# Patient Record
Sex: FEMALE | URBAN - METROPOLITAN AREA
[De-identification: names, ages, dates, MRNs, and addresses within clinical notes are randomized per-mention and may not be internally consistent; named-entity substitution may affect disease eponyms.]

---

## 2020-10-05 ENCOUNTER — APPOINTMENT (OUTPATIENT)
Dept: URBAN - METROPOLITAN AREA CLINIC 252 | Age: 35
Setting detail: DERMATOLOGY
End: 2020-10-06

## 2020-10-05 VITALS — HEIGHT: 72 IN | WEIGHT: 175 LBS | RESPIRATION RATE: 18 BRPM

## 2020-10-05 DIAGNOSIS — D22 MELANOCYTIC NEVI: ICD-10-CM

## 2020-10-05 DIAGNOSIS — L81.4 OTHER MELANIN HYPERPIGMENTATION: ICD-10-CM

## 2020-10-05 DIAGNOSIS — L57.0 ACTINIC KERATOSIS: ICD-10-CM

## 2020-10-05 DIAGNOSIS — D18.0 HEMANGIOMA: ICD-10-CM

## 2020-10-05 DIAGNOSIS — L82.1 OTHER SEBORRHEIC KERATOSIS: ICD-10-CM

## 2020-10-05 PROBLEM — D22.5 MELANOCYTIC NEVI OF TRUNK: Status: ACTIVE | Noted: 2020-10-05

## 2020-10-05 PROBLEM — D22.62 MELANOCYTIC NEVI OF LEFT UPPER LIMB, INCLUDING SHOULDER: Status: ACTIVE | Noted: 2020-10-05

## 2020-10-05 PROBLEM — D18.01 HEMANGIOMA OF SKIN AND SUBCUTANEOUS TISSUE: Status: ACTIVE | Noted: 2020-10-05

## 2020-10-05 PROBLEM — D22.71 MELANOCYTIC NEVI OF RIGHT LOWER LIMB, INCLUDING HIP: Status: ACTIVE | Noted: 2020-10-05

## 2020-10-05 PROBLEM — L86 KERATODERMA IN DISEASES CLASSIFIED ELSEWHERE: Status: ACTIVE | Noted: 2020-10-05

## 2020-10-05 PROCEDURE — OTHER PRESCRIPTION SAMPLES PROVIDED: OTHER

## 2020-10-05 PROCEDURE — 17000 DESTRUCT PREMALG LESION: CPT

## 2020-10-05 PROCEDURE — 17003 DESTRUCT PREMALG LES 2-14: CPT

## 2020-10-05 PROCEDURE — OTHER COUNSELING: OTHER

## 2020-10-05 PROCEDURE — 99203 OFFICE O/P NEW LOW 30 MIN: CPT | Mod: 25

## 2020-10-05 PROCEDURE — OTHER LIQUID NITROGEN: OTHER

## 2020-10-05 ASSESSMENT — LOCATION ZONE DERM
LOCATION ZONE: ARM
LOCATION ZONE: LEG
LOCATION ZONE: FACE
LOCATION ZONE: TRUNK
LOCATION ZONE: SCALP

## 2020-10-05 ASSESSMENT — LOCATION DETAILED DESCRIPTION DERM
LOCATION DETAILED: LEFT MID-UPPER BACK
LOCATION DETAILED: RIGHT INFERIOR FOREHEAD
LOCATION DETAILED: RIGHT CENTRAL MALAR CHEEK
LOCATION DETAILED: LEFT LATERAL MALAR CHEEK
LOCATION DETAILED: LEFT DISTAL DORSAL FOREARM
LOCATION DETAILED: UPPER STERNUM
LOCATION DETAILED: RIGHT MID-UPPER BACK
LOCATION DETAILED: EPIGASTRIC SKIN
LOCATION DETAILED: RIGHT RIB CAGE
LOCATION DETAILED: LEFT CENTRAL FRONTAL SCALP
LOCATION DETAILED: RIGHT ANTERIOR DISTAL THIGH
LOCATION DETAILED: LEFT RIB CAGE
LOCATION DETAILED: LEFT POSTERIOR SHOULDER
LOCATION DETAILED: SUPERIOR MID FOREHEAD

## 2020-10-05 ASSESSMENT — LOCATION SIMPLE DESCRIPTION DERM
LOCATION SIMPLE: RIGHT UPPER BACK
LOCATION SIMPLE: RIGHT CHEEK
LOCATION SIMPLE: LEFT UPPER BACK
LOCATION SIMPLE: LEFT FOREARM
LOCATION SIMPLE: CHEST
LOCATION SIMPLE: SUPERIOR FOREHEAD
LOCATION SIMPLE: RIGHT THIGH
LOCATION SIMPLE: LEFT CHEEK
LOCATION SIMPLE: ABDOMEN
LOCATION SIMPLE: RIGHT FOREHEAD
LOCATION SIMPLE: LEFT SCALP
LOCATION SIMPLE: LEFT SHOULDER

## 2020-11-17 ENCOUNTER — APPOINTMENT (OUTPATIENT)
Dept: URBAN - METROPOLITAN AREA CLINIC 252 | Age: 35
Setting detail: DERMATOLOGY
End: 2020-11-17

## 2020-11-17 VITALS — WEIGHT: 175 LBS | RESPIRATION RATE: 16 BRPM | HEIGHT: 70 IN

## 2020-11-17 DIAGNOSIS — L21.8 OTHER SEBORRHEIC DERMATITIS: ICD-10-CM

## 2020-11-17 DIAGNOSIS — L72.0 EPIDERMAL CYST: ICD-10-CM

## 2020-11-17 DIAGNOSIS — L57.0 ACTINIC KERATOSIS: ICD-10-CM

## 2020-11-17 PROCEDURE — 10060 I&D ABSCESS SIMPLE/SINGLE: CPT

## 2020-11-17 PROCEDURE — OTHER PRESCRIPTION: OTHER

## 2020-11-17 PROCEDURE — OTHER COUNSELING: OTHER

## 2020-11-17 PROCEDURE — OTHER INCISION AND DRAINAGE: OTHER

## 2020-11-17 PROCEDURE — 99213 OFFICE O/P EST LOW 20 MIN: CPT | Mod: 25

## 2020-11-17 RX ORDER — CEPHALEXIN 500 MG/1
BID TABLET ORAL BID
Qty: 28 | Refills: 0 | COMMUNITY
Start: 2020-11-17

## 2020-11-17 ASSESSMENT — LOCATION SIMPLE DESCRIPTION DERM
LOCATION SIMPLE: LEFT SHOULDER
LOCATION SIMPLE: RIGHT CHEEK
LOCATION SIMPLE: LEFT EAR
LOCATION SIMPLE: LEFT SHOULDER

## 2020-11-17 ASSESSMENT — LOCATION ZONE DERM
LOCATION ZONE: ARM
LOCATION ZONE: FACE
LOCATION ZONE: EAR
LOCATION ZONE: ARM

## 2020-11-17 ASSESSMENT — LOCATION DETAILED DESCRIPTION DERM
LOCATION DETAILED: LEFT POSTERIOR SHOULDER
LOCATION DETAILED: RIGHT MEDIAL MALAR CHEEK
LOCATION DETAILED: LEFT POSTERIOR SHOULDER
LOCATION DETAILED: LEFT POSTERIOR EAR

## 2020-11-17 NOTE — PROCEDURE: INCISION AND DRAINAGE
Render Postcare In Note?: No
Suture Text: The incision was partially closed with
Method: 11 blade
Anesthesia Type: 1% lidocaine with epinephrine
Consent was obtained and risks were reviewed including but not limited to delayed wound healing, infection, need for multiple I and D's, and pain.
Anesthesia Volume In Cc: 0.1
Curette Text (Optional): After the contents were expressed a curette was used to partially remove the cyst wall.
Lesion Type: Cyst
Dressing: dry sterile dressing
Epidermal Closure: simple interrupted
Detail Level: Detailed
Post-Care Instructions: I reviewed with the patient in detail post-care instructions. Patient should keep wound covered and call the office should any redness, pain, swelling or worsening occur.
Size Of Lesion In Cm (Optional But May Be Required For Some Insurances): 0
Epidermal Sutures: 4-0 Ethilon
Wound Care: Petrolatum
Preparation Text: The area was prepped in the usual clean fashion.

## 2020-11-17 NOTE — HPI: CYST
How Severe Is Your Cyst?: moderate
Is This A New Presentation, Or A Follow-Up?: Cyst
Additional History: Difficult to sleep because of the pain but better now , no fever

## 2021-03-01 ENCOUNTER — TRANSFERRED RECORDS (OUTPATIENT)
Dept: HEALTH INFORMATION MANAGEMENT | Facility: CLINIC | Age: 36
End: 2021-03-01

## 2021-03-12 NOTE — TELEPHONE ENCOUNTER
Action    Action Taken 3-12: Requested from [facility]:    EKG Strips    Exercise stress test    Echo   3-3-21, 3-3-21    3-2-21    3-2-21     3-31: sent another request to [facility] 4/9/21 MV 2.18pm   Action Taken Imaging disk received from [facility] via mail. Sent to  for processing.

## 2021-03-18 ENCOUNTER — VIRTUAL VISIT (OUTPATIENT)
Dept: CARDIOLOGY | Facility: CLINIC | Age: 36
End: 2021-03-18
Attending: INTERNAL MEDICINE
Payer: COMMERCIAL

## 2021-03-18 ENCOUNTER — PRE VISIT (OUTPATIENT)
Dept: CARDIOLOGY | Facility: CLINIC | Age: 36
End: 2021-03-18

## 2021-03-18 DIAGNOSIS — I47.29 NONSUSTAINED VENTRICULAR TACHYCARDIA (H): Primary | ICD-10-CM

## 2021-03-18 PROCEDURE — 99204 OFFICE O/P NEW MOD 45 MIN: CPT | Mod: 95 | Performed by: INTERNAL MEDICINE

## 2021-03-18 RX ORDER — METOPROLOL TARTRATE 25 MG/1
50 TABLET, FILM COATED ORAL
COMMUNITY
Start: 2021-03-02 | End: 2021-03-30

## 2021-03-18 RX ORDER — MULTIVITAMIN
1 TABLET ORAL
COMMUNITY
End: 2021-04-14

## 2021-03-18 NOTE — LETTER
3/18/2021      RE: Karina Tubbs  [address] [address] [address]       Dear Colleague,    Thank you for the opportunity to participate in the care of your patient, Karina Tubbs, at the [facility]. Please see a copy of my visit note below.    Karina is a 35 year old who is being evaluated via a billable video visit.      How would you like to obtain your AVS? MyChart  If the video visit is dropped, the invitation should be resent by: Text to cell phone: [phone]  Will anyone else be joining your video visit? No      HPI:     Karina is a pleasant 35-year-old woman who was recently seen in a hospital in [facility] for evaluation of sunburn.  She has had this visit with her /significant other     At the time of the [place]  ED visit she was found on heart monitor to be exhibiting PVCs and nonsustained VT which by description seem most likely to be right ventricular outflow tract tachycardia.  For the most part she has been unaware of any palpitations until that time but in retrospect feels that she may have had this fluttering in her heart for the last several months.  She has never been lightheaded or dizzy with them and has no other cardiac symptoms.    During her hospitalization in [place] (Dr. Mata [facility] in [place]) she was apparently noted to have a relatively low potassium (3.3) and a TSH of 5.95.  Her chest x-ray was normal and her troponins were negative.    In [place] she was initially started on amiodarone drip but this was ultimately terminated and she was sent out with and metoprolol 25 mg twice daily.  Since then it has been increased to 50 mg twice daily and appears to be controlling her symptoms quite nicely.  However, Karina is worried that the drug is causing her to be excessively fatigued and that she does not like the side effects.    There is no family history of comparable arrhythmia and the  patient herself has no cardiac history of note.  She is a non-smoker and does not use much in the way of alcohol or caffeine-containing drinks.  She does not use nonprescribed drugs.    We do not at this time have copies of the [facility] medical records.  Apparently however Karina was recently seen in [facility] in [place] and at that time was reassured that the arrhythmia was not life-threatening.  Apparently her insurance scheme is changed and she is no longer seen at [facility].    Patient's principal past medical history includes premature ovarian failure.    PAST MEDICAL HISTORY:  No past medical history on file.    CURRENT MEDICATIONS:  Current Outpatient Medications   Medication Sig Dispense Refill     metoprolol tartrate (LOPRESSOR) 25 MG tablet Take 50 mg by mouth       Specialty Vitamins Products (VITAMINS FOR HAIR) TABS Take 1 tablet by mouth         PAST SURGICAL HISTORY:  No past surgical history on file.    ALLERGIES:     Allergies   Allergen Reactions     Sulfa Drugs Rash     Allergic since childhood         FAMILY HISTORY:  No family history on file.       SOCIAL HISTORY:  Social History     Tobacco Use     Smoking status: Not on file   Substance Use Topics     Alcohol use: Not on file     Drug use: Not on file       ROS:   Constitutional: No fever, chills, or sweats. Weight stable.   ENT: No visual disturbance, ear ache, epistaxis, sore throat.   Cardiovascular: As per HPI.   Respiratory: No cough, hemoptysis.    GI: No nausea, vomiting,   : No hematuria.   Integument: Negative.   Psychiatric: Negative.   Hematologic:   no easy bleeding.  Neuro: Negative.   Endocrinology: No significant heat or cold intolerance   Musculoskeletal: No myalgia.    Exam:  There were no vitals taken for this visit.  GENERAL APPEARANCE: healthy, alert and no distress    RESPIRATORY: - no rales, rhonchi or wheezes, no use of accessory muscles, no retractions, respirations are unlabored, normal respiratory rate      NEURO: alert and oriented to person/place/time, normal speech, and affect    SKIN: no ecchymoses, no rashes    Labs:  CBC RESULTS:   No results found for: WBC, RBC, HGB, HCT, MCV, MCH, MCHC, RDW, PLT    BMP RESULTS:  No results found for: NA, POTASSIUM, CHLORIDE, CO2, ANIONGAP, GLC, BUN, CR, GFRESTIMATED, GFRESTBLACK, GABRIELA     INR RESULTS:  No results found for: INR    Procedures:  PULMONARY FUNCTION TESTS:   No flowsheet data found.      ECHOCARDIOGRAM:   No results found for this or any previous visit (from the past 8760 hour(s)).      Assessment and Plan:  1.  Nonsustained ventricular tachycardia-probably RV outflow tract tachycardia by history, ECG is not currently available  2.  Good medical response to beta-blockers but adverse effects of drugs are bothering patient    Plan  1.  Provide CardioNet event monitor for 10 days to document frequency/severity of arrhythmia  2.  Follow-up clinic appointment when event monitor is complete.  Patient may prefer ablation to continue drug therapy for reasons noted in #2 above  3. Please obtain permission to obtain medical records from [place]  4.  Schedule for echocardiogram to assess left ventricular function and RV     Video on 4: 30;; video off for: 55  Total elapsed time with documentation 45 minutes  Platform Doximity  Patient at home; clinic [facility] heart    I very much appreciated the opportunity to see and assess Karina Tubbs in the clinic today. Please do not hesitate to contact my office if you have any questions or concerns.      David Berry MD  Cardiac Arrhythmia Service  [facility]  [phone]      CC  SELF, REFERRED

## 2021-03-18 NOTE — PROGRESS NOTES
Karina is a 35 year old who is being evaluated via a billable video visit.      How would you like to obtain your AVS? MyChart  If the video visit is dropped, the invitation should be resent by: Text to cell phone: [phone]  Will anyone else be joining your video visit? No      HPI:     Karina is a pleasant 35-year-old woman who was recently seen in a hospital in [facility] for evaluation of sunburn.  She has had this visit with her /significant other     At the time of the [place]  ED visit she was found on heart monitor to be exhibiting PVCs and nonsustained VT which by description seem most likely to be right ventricular outflow tract tachycardia.  For the most part she has been unaware of any palpitations until that time but in retrospect feels that she may have had this fluttering in her heart for the last several months.  She has never been lightheaded or dizzy with them and has no other cardiac symptoms.    During her hospitalization in [place] (Dr. Mata [facility] in [place]) she was apparently noted to have a relatively low potassium (3.3) and a TSH of 5.95.  Her chest x-ray was normal and her troponins were negative.    In [place] she was initially started on amiodarone drip but this was ultimately terminated and she was sent out with and metoprolol 25 mg twice daily.  Since then it has been increased to 50 mg twice daily and appears to be controlling her symptoms quite nicely.  However, Karina is worried that the drug is causing her to be excessively fatigued and that she does not like the side effects.    There is no family history of comparable arrhythmia and the patient herself has no cardiac history of note.  She is a non-smoker and does not use much in the way of alcohol or caffeine-containing drinks.  She does not use nonprescribed drugs.    We do not at this time have copies of the [facility] medical records.  Apparently however Karina was recently seen in [facility] in  [place] and at that time was reassured that the arrhythmia was not life-threatening.  Apparently her insurance scheme is changed and she is no longer seen at [facility].    Patient's principal past medical history includes premature ovarian failure.    PAST MEDICAL HISTORY:  No past medical history on file.    CURRENT MEDICATIONS:  Current Outpatient Medications   Medication Sig Dispense Refill     metoprolol tartrate (LOPRESSOR) 25 MG tablet Take 50 mg by mouth       Specialty Vitamins Products (VITAMINS FOR HAIR) TABS Take 1 tablet by mouth         PAST SURGICAL HISTORY:  No past surgical history on file.    ALLERGIES:     Allergies   Allergen Reactions     Sulfa Drugs Rash     Allergic since childhood         FAMILY HISTORY:  No family history on file.       SOCIAL HISTORY:  Social History     Tobacco Use     Smoking status: Not on file   Substance Use Topics     Alcohol use: Not on file     Drug use: Not on file       ROS:   Constitutional: No fever, chills, or sweats. Weight stable.   ENT: No visual disturbance, ear ache, epistaxis, sore throat.   Cardiovascular: As per HPI.   Respiratory: No cough, hemoptysis.    GI: No nausea, vomiting,   : No hematuria.   Integument: Negative.   Psychiatric: Negative.   Hematologic:   no easy bleeding.  Neuro: Negative.   Endocrinology: No significant heat or cold intolerance   Musculoskeletal: No myalgia.    Exam:  There were no vitals taken for this visit.  GENERAL APPEARANCE: healthy, alert and no distress    RESPIRATORY: - no rales, rhonchi or wheezes, no use of accessory muscles, no retractions, respirations are unlabored, normal respiratory rate     NEURO: alert and oriented to person/place/time, normal speech, and affect    SKIN: no ecchymoses, no rashes    Labs:  CBC RESULTS:   No results found for: WBC, RBC, HGB, HCT, MCV, MCH, MCHC, RDW, PLT    BMP RESULTS:  No results found for: NA, POTASSIUM, CHLORIDE, CO2, ANIONGAP, GLC, BUN, CR, GFRESTIMATED, GFRESTBLACK, GABRIELA      INR RESULTS:  No results found for: INR    Procedures:  PULMONARY FUNCTION TESTS:   No flowsheet data found.      ECHOCARDIOGRAM:   No results found for this or any previous visit (from the past 8760 hour(s)).      Assessment and Plan:  1.  Nonsustained ventricular tachycardia-probably RV outflow tract tachycardia by history, ECG is not currently available  2.  Good medical response to beta-blockers but adverse effects of drugs are bothering patient    Plan  1.  Provide CardioNet event monitor for 10 days to document frequency/severity of arrhythmia  2.  Follow-up clinic appointment when event monitor is complete.  Patient may prefer ablation to continue drug therapy for reasons noted in #2 above  3. Please obtain permission to obtain medical records from [place]  4.  Schedule for echocardiogram to assess left ventricular function and RV     Video on 4: 30;; video off for: 55  Total elapsed time with documentation 45 minutes  Platform Doximity  Patient at home; clinic [facility] heart    I very much appreciated the opportunity to see and assess Karina Tubbs in the clinic today. Please do not hesitate to contact my office if you have any questions or concerns.      David Berry MD  Cardiac Arrhythmia Service  [facility]  [phone]      CC  SELF, REFERRED

## 2021-03-18 NOTE — PATIENT INSTRUCTIONS
You were seen in the Electrophysiology Clinic today by: David Berry MD    Plan:     Labs/Tests Needed:    Echocardiogram    10 day Biotel (Cardionet) Monitor    Follow up visit:    After completion of monitor and echocardiogram.      Your Care Team:  EP Cardiology   Telephone Number     Stormy Oliveros RN [phone]     For scheduling appts or procedures:    Gudelia Macdonald   [phone]   For the Device Clinic (Pacemakers, ICDs, Loop Recorders)    During business hours: [phone]  After business hours:   [phone]- select option 4 and ask for job code [id number].     On-call cardiologist for after hours or on weekends: [phone], option #4, and ask to speak to the on-call cardiologist.     Cardiovascular Clinic:   [address] [place] [address]. [place] [address]      As always, Thank you for trusting us with your health care needs!

## 2021-03-19 ENCOUNTER — TELEPHONE (OUTPATIENT)
Dept: CARDIOLOGY | Facility: CLINIC | Age: 36
End: 2021-03-19

## 2021-03-24 ENCOUNTER — TELEPHONE (OUTPATIENT)
Dept: CARDIOLOGY | Facility: CLINIC | Age: 36
End: 2021-03-24

## 2021-03-24 NOTE — TELEPHONE ENCOUNTER
Health Call Center    Phone Message    May a detailed message be left on voicemail: yes     Reason for Call: Other: Daija from [employer] returning call in regards to heart monitor. She wanted to let clinic know that pt needs to be enrolled with the device, and right now the pt is enrolled for a mail device, which is causing the issue, so it needs to be an in clinic instead. If any questions call daija back at number listed, or if she doesn't answer the general company # is [phone].      Action Taken: Message routed to:  [facility] ([facility] cardio    Travel Screening: Not Applicable

## 2021-03-24 NOTE — TELEPHONE ENCOUNTER
Called and spoke with Karina. She agreed to come into clinic today to  the monitor to place on at home. I will review instructions with her.     Fahad

## 2021-03-24 NOTE — TELEPHONE ENCOUNTER
[facility] Call Center    Phone Message    May a detailed message be left on voicemail: yes     Reason for Call: Other: pt stated she has not received a call about her cardiac monitor getting sent to her home, looks like Fahad left a VM on 3/19 and she did not get any VM, please call Karina back asap thank you      Action Taken: Message routed to:  [facility] ([facility] heart    Travel Screening: Not Applicable

## 2021-03-25 ENCOUNTER — TELEPHONE (OUTPATIENT)
Dept: CARDIOLOGY | Facility: CLINIC | Age: 36
End: 2021-03-25

## 2021-03-25 NOTE — TELEPHONE ENCOUNTER
Cardiology fellow on call paged by CardioNet to convey information about patient's run of NSVT.     2 episodes reported:   - 1st episode 27 beats long @ rate of 158 (unclear if monomorphic) (asymptomatic).  - 2nd episode lasted about 34 secs @ rate of 157 >> patient symptomatic during the second episode in the form of palpitations.      Case d/w Dr Alas. Will continue CardioNet monitoring. No emergent referral to the ER warranted given chronicity of symptoms and slow ventricular rate.        Tiffanie Cornelius MD,   Cardiovascular Disease Fellow  Pager [phone]

## 2021-03-29 ENCOUNTER — TELEPHONE (OUTPATIENT)
Dept: CARDIOLOGY | Facility: CLINIC | Age: 36
End: 2021-03-29

## 2021-03-30 NOTE — TELEPHONE ENCOUNTER
Notified by Sadaf that patient had an episode of sinus tachycardia with then 15 beats of NSVT at 6:50 PM this evening (rates ~155). Contacted the patient who reports she was walking to her car at that time, and was completely asymptomatic. Patient reports this is the third time she has been contacted for episodes in the last 5 days while wearing the monitor. Patient reports she hears different recommendations at each time, and is getting very frustrated.     Discussed with patient signs/symptoms to watch for including dizziness, lightheadedness, new or prolonged palpitations, chest pain, dyspnea, or syncope. Patient understands and agrees to go to the ED if any of these develop. Patient does request either Dr. Berry or his RN contact her tomorrow to talk through further recommendations about the monitor as she is concerned with the different information she has gotten over the last week while wearing this.    Will forward this to Dr. Berry and EP RN for follow-up.    Delisa Joshi MD  Cardiology Fellow

## 2021-03-30 NOTE — TELEPHONE ENCOUNTER
Spoke to Dr. Berry regarding patient's case and reviewing Biotel strips thus far. Frequent NSVT episodes likely warranting ablation.     Date: 3/30/2021    Time of Call: 12:24 PM     Diagnosis:  NSVT     [ VORB ] Ordering provider: David Berry MD  Order:   -Referral to Dr. Alas re: VT ablation.  -Can discontinue monitor (8.5 days of data).  -Recommend she resume Metoprolol tartrate 25 BID.  -Educate on symptoms warranting ED visit. ED only if symptomatic.     Order received by: Stormy Oliveros RN     Follow-up/additional notes:     Called and spoke to patient and discussed the above recommendations. She is not interested in AAT and will not resume metoprolol, since it caused nausea, swelling, and fatigue. She is interested in a VT ablation and would like to do it as soon as possible. She is open to seeing Dr. Alas to discuss. Scheduled video visit on 4/12 at 1600. Appointment with Laura Dumont on 4/8 cancelled.     Discussed she may take off her monitor and mail it back in. Advised she may receive a call from our on-call tonight or tomorrow pending when the last urgent report comes in after taking the monitor off.     Clarified with patient, that per Dr. Berry, she does not need to go to the emergency room when the on-call calls. She should go only if she has symptoms. Discussed should she have any symptoms, including lightheadedness, dizziness, prolonged palpitations, pre-syncopal sx, chest pain, or dyspnea, then she should go to the ED. If she faints, someone should call 911. She has not been symptomatic. She verbalized understanding of this.     Will update MD re: decision not to resume metoprolol. Will call patient back only if there is concern regarding this.     Patient will call with any further concerns or questions prior to her appointment with Dr. Alas.    Stormy Oliveros, YOLIN, RN, PHN  Electrophysiology Nurse Coordinator

## 2021-04-12 ENCOUNTER — VIRTUAL VISIT (OUTPATIENT)
Dept: CARDIOLOGY | Facility: CLINIC | Age: 36
End: 2021-04-12
Attending: INTERNAL MEDICINE
Payer: COMMERCIAL

## 2021-04-12 DIAGNOSIS — I47.29 PAROXYSMAL VENTRICULAR TACHYCARDIA (H): Primary | ICD-10-CM

## 2021-04-12 PROCEDURE — 99215 OFFICE O/P EST HI 40 MIN: CPT | Mod: 95 | Performed by: INTERNAL MEDICINE

## 2021-04-12 NOTE — LETTER
April 14, 2021      TO: Karina Tubbs  [address] [facility] [address]         Dear Karina,      You are scheduled for a VT ablation, at [facility] with Dr. Alas The hospital is located at [address] [place] [address] on the [place] of the [place].  If you need to cancel this procedure, please call [phone]. Please note the following schedule below:    You will need to undergo a COVID-19 PCR swab test within 4 days of procedure. You will receive a phone call with more information. If you do not hear from the COVID scheduling team, please call: [phone] to schedule.      Date:   April 27, 2021 Time: _6:30am__Arrive to the [facility] Waiting Room at the [facility]  VT Ablation.    1. Your history and physical will be completed by our nurse practitioner when you arrive.  2. Please do not eat anything for 8 hours prior to your procedure. You may have sips of water up until 2 hours prior to your arrival.  3. The morning of your procedure, you may take your scheduled medications with a sip of water.  4. You will discharge the same day and need a .    Post-Procedure Instructions  Care of groin site:    Remove the Band-Aid after 24 hours. If there is minor oozing, apply another Band-aid and remove it after 12 hours.     Do NOT take a bath, use a hot tub, pool, or submerse in water for at least 3 days. You may shower.     It is normal to have a small bruise or lump at the site.    Do not scrub the site.    Do not use lotion or powder near the puncture site for 3 days.    If you start bleeding from the site in your groin: Lie down flat and press firmly on the site. Call your physician immediately, or, come to the emergency room.  Call 911 right away if you have bleeding that is heavy or does not stop.    Call your doctor/provider if:     You have a large or growing hard lump around the site.     The site is red, swollen, hot or tender.     Blood or fluid is draining from the  site.     You have chills or a fever greater than 101 F (38 C).     Your leg or arm turns bluish, feels numb or cool.     You have hives, a rash or unusual itching.      Activity Restrictions    For the first 2 days: Do not stoop or squat. When you cough, sneeze or move your bowels, hold your hand over the puncture site and press gently.    Do not lift more than 10 pounds or exertional activity for 10 days.  - No driving for 24 hours after (with or without general anesthesia).       Date: __August 6, 2021__3pm ___ Follow up with Dr. Alas    Please do not hesitate to utilize [facility] or call us if you have any questions or concerns.    Beth Estevez, EMERALD  Electrophysiology Nurse Coordinator  [phone]    [facility] Procedure   [phone]

## 2021-04-12 NOTE — PATIENT INSTRUCTIONS
You were seen virtually in Electrophysiology today by: Dr Alas    You are scheduled [facility] The hospital is located at [address] [place] [address] on the [place] of [place].  If you need to cancel this procedure, please call [phone]. Please note the following schedule below:        You will need to undergo a COVID-19 PCR swab test within 4 days of procedure. You will receive a phone call with more information. If you do not hear from the COVID scheduling team, please call: [phone] to schedule.    Date:   Time: _________________Arrive to the [facility] Waiting Room at the [facility]  PVC Ablation.    1. Your history and physical will be completed by our nurse practitioner when you arrive.  2. Please do not eat anything for 8 hours prior to your procedure. You may have sips of water up until 2 hours prior to your arrival.  3. The morning of your procedure, you may take your scheduled medications with a sip of water.  4. You will discharge the same day and need a .        Post-Procedure Instructions  Care of groin site:    Remove the Band-Aid after 24 hours. If there is minor oozing, apply another Band-aid and remove it after 12 hours.     Do NOT take a bath, use a hot tub, pool, or submerse in water for at least 3 days. You may shower.     It is normal to have a small bruise or lump at the site.    Do not scrub the site.    Do not use lotion or powder near the puncture site for 3 days.    If you start bleeding from the site in your groin: Lie down flat and press firmly on the site. Call your physician immediately, or, come to the emergency room.  Call 911 right away if you have bleeding that is heavy or does not stop.    Call your doctor/provider if:     You have a large or growing hard lump around the site.     The site is red, swollen, hot or tender.     Blood or fluid is draining from the site.     You have chills or  a fever greater than 101 F (38 C).     Your leg or arm turns bluish, feels numb or cool.     You have hives, a rash or unusual itching.      Activity Restrictions    For the first 2 days: Do not stoop or squat. When you cough, sneeze or move your bowels, hold your hand over the puncture site and press gently.    Do not lift more than 10 pounds or exertional activity for 10 days.  - No driving for 24 hours after (with or without general anesthesia).       Date: _______ Follow up with Dr. Alas    Please do not hesitate to utilize [facility] or call us if you have any questions or concerns.    Beth Estevez RN  Electrophysiology Nurse Coordinator  [phone]    [facility] Procedure   [phone]

## 2021-04-12 NOTE — PROGRESS NOTES
"Karina is a 35 year old who is being evaluated via a billable video visit.      How would you like to obtain your AVS? MyChart  If the video visit is dropped, the invitation should be resent by: Text to cell phone: [phone]  My chart video connection  Will anyone else be joining your video visit? No      Vitals - Patient Reported  Weight (Patient Reported): 81.6 kg (180 lb)  Height (Patient Reported): 177.8 cm (5' 10\")  BMI (Based on Pt Reported Ht/Wt): 25.83  Pain Score: No Pain (0)(No SOB)    Vitals were taken and medication reconciled.    RUBEN Byrne  3:46 PM      Video Start Time: 4:13 PM    Physical Exam   GENERAL: Healthy, alert and no distress  EYES: Eyes grossly normal to inspection.  No discharge or erythema, or obvious scleral/conjunctival abnormalities.  RESP: No audible wheeze, cough, or visible cyanosis.  No visible retractions or increased work of breathing.    SKIN: Visible skin clear. No significant rash, abnormal pigmentation or lesions.  NEURO: Cranial nerves grossly intact.  Mentation and speech appropriate for age.  PSYCH: Mentation appears normal, affect normal/bright, judgement and insight intact, normal speech and appearance well-groomed.      Video-Visit Details    Type of service:  Video Visit    Video End Time:4:44 PM    Originating Location (pt. Location): Home    Distant Location (provider location):  [facility]     Platform used for Video Visit: Trulioo     HPI:  Mrs. Tubbs is a 34 yo female with a PMH of PVCs and VT.  She was referred to consider whether she might be a candidate for catheter ablation of PVCs and VTs.  She has been healthy until early 2021 when she visited [place].  At that time, she developed sunburn and visited a walk-in clinic where she was found to PVCs and VTs. She was also found to have low potassium. Even after potassium level was corrected, she continued to have PVCs and VTs. In [place] she was initially started on amiodarone drip but " this was ultimately terminated and she was sent out with metoprolol 25 mg twice daily.  Since then it has been increased to 50 mg twice daily and appears to be controlling her symptoms quite nicely.  However, she could not tolerate such a big dose of Metoprolol.  She complained of palpitation, bur denied any chest pain, SOB or dizziness.        PAST MEDICAL HISTORY:  No past medical history on file.    CURRENT MEDICATIONS:  Current Outpatient Medications   Medication Sig Dispense Refill     Specialty Vitamins Products (VITAMINS FOR HAIR) TABS Take 1 tablet by mouth         PAST SURGICAL HISTORY:  No past surgical history on file.    ALLERGIES:     Allergies   Allergen Reactions     Sulfa Drugs Rash     Allergic since childhood         FAMILY HISTORY:  - Premature coronary artery disease  - Atrial fibrillation  - Sudden cardiac death     SOCIAL HISTORY:  Social History     Tobacco Use     Smoking status: Never Smoker     Smokeless tobacco: Never Used   Substance Use Topics     Alcohol use: None     Drug use: None       ROS:   12 points of ROS were reviewed.  Constitutional: No fever, chills, or sweats. Weight stable.   ENT: No visual disturbance, ear ache, epistaxis, sore throat.   Cardiovascular: As per HPI.   Respiratory: No cough, hemoptysis.    GI: No nausea, vomiting, hematemesis, melena, or hematochezia.   : No hematuria.   Integument: Negative.   Psychiatric: Negative.   Hematologic:  Easy bruising, no easy bleeding.  Neuro: Negative.   Endocrinology: No significant heat or cold intolerance   Musculoskeletal: No myalgia.    Exam:  GENERAL: Healthy, alert and no distress  EYES: Eyes grossly normal to inspection.  No discharge or erythema, or obvious scleral/conjunctival abnormalities.  RESP: No audible wheeze, cough, or visible cyanosis.  No visible retractions or increased work of breathing.    SKIN: Visible skin clear. No significant rash, abnormal pigmentation or lesions.  NEURO: Cranial nerves grossly  intact.  Mentation and speech appropriate for age.  PSYCH: Mentation appears normal, affect normal/bright, judgement and insight intact, normal speech and appearance well-groomed.    Labs:  CBC RESULTS:   No results found for: WBC, RBC, HGB, HCT, MCV, MCH, MCHC, RDW, PLT    BMP RESULTS:  No results found for: NA, POTASSIUM, CHLORIDE, CO2, ANIONGAP, GLC, BUN, CR, GFRESTIMATED, GFRESTBLACK, GABRIELA     INR RESULTS:  No results found for: INR    Procedures:  Biotel monitor in 3/2021: Reviewed.            Stree TTE on 3/2/2021: Reviewed.  WNL.    Assessment and Plan:  # PVCs and NSVTs  PVC burden = 2% per Biotel monitor.  PVCs and VTs are likely originating from the same focus.   Because the patient cannot tolerate a high dose of Metoprolol to control her PVCs and VTs, she would be a candidate for catheter ablation of PVCs and VTs.  The nature, risks, benefits, alternatives and anticipated results of the procedure were explained to the patient. These risks include but are not limited to local vascular damage, bleeding, tamponade and stroke. After careful consideration the patient wished to proceed with the procedure. The patient was verbally consented. We will schedule the patient to have this done at her earliest convenience.    I spent a total of 88 min to review the records, see the patient, and complete the documents.       CC  Patient Care Team:  Stormy Oliveros RN as Specialty Care Coordinator (Clinical Cardiac Electrophysiology)  David Berry MD as MD (Clinical Cardiac Electrophysiology)  David Berry MD as Assigned Heart and Vascular Provider  Beth Estevez RN as Specialty Care Coordinator (Cardiology)  Papa Alas MD (Cardiovascular Disease)  STORM TSAI

## 2021-04-12 NOTE — LETTER
"4/12/2021      RE: Karina Tubbs  [id number] [address] [id number]       Dear Colleague,    Thank you for the opportunity to participate in the care of your patient, Karina Tubbs, at the [facility] HEART [facility]. Please see a copy of my visit note below.    Karina is a 35 year old who is being evaluated via a billable video visit.      How would you like to obtain your AVS? MyChart  If the video visit is dropped, the invitation should be resent by: Text to cell phone: [phone]  My chart video connection  Will anyone else be joining your video visit? No      Vitals - Patient Reported  Weight (Patient Reported): 81.6 kg (180 lb)  Height (Patient Reported): 177.8 cm (5' 10\")  BMI (Based on Pt Reported Ht/Wt): 25.83  Pain Score: No Pain (0)(No SOB)    Vitals were taken and medication reconciled.    RUBEN Byrne  3:46 PM      Video Start Time: 4:13 PM    Physical Exam   GENERAL: Healthy, alert and no distress  EYES: Eyes grossly normal to inspection.  No discharge or erythema, or obvious scleral/conjunctival abnormalities.  RESP: No audible wheeze, cough, or visible cyanosis.  No visible retractions or increased work of breathing.    SKIN: Visible skin clear. No significant rash, abnormal pigmentation or lesions.  NEURO: Cranial nerves grossly intact.  Mentation and speech appropriate for age.  PSYCH: Mentation appears normal, affect normal/bright, judgement and insight intact, normal speech and appearance well-groomed.      Video-Visit Details    Type of service:  Video Visit    Video End Time:4:44 PM    Originating Location (pt. Location): Home    Distant Location (provider location):  [facility]     Platform used for Video Visit: Daniel     HPI:  Mrs. Tubbs is a 34 yo female with a PMH of PVCs and VT.  She was referred to consider whether she might be a candidate for catheter ablation of PVCs and VTs.  She has been " healthy until early 2021 when she visited [place].  At that time, she developed sunburn and visited a walk-in clinic where she was found to PVCs and VTs. She was also found to have low potassium. Even after potassium level was corrected, she continued to have PVCs and VTs. In [place] she was initially started on amiodarone drip but this was ultimately terminated and she was sent out with metoprolol 25 mg twice daily.  Since then it has been increased to 50 mg twice daily and appears to be controlling her symptoms quite nicely.  However, she could not tolerate such a big dose of Metoprolol.  She complained of palpitation, bur denied any chest pain, SOB or dizziness.        PAST MEDICAL HISTORY:  No past medical history on file.    CURRENT MEDICATIONS:  Current Outpatient Medications   Medication Sig Dispense Refill     Specialty Vitamins Products (VITAMINS FOR HAIR) TABS Take 1 tablet by mouth         PAST SURGICAL HISTORY:  No past surgical history on file.    ALLERGIES:     Allergies   Allergen Reactions     Sulfa Drugs Rash     Allergic since childhood         FAMILY HISTORY:  - Premature coronary artery disease  - Atrial fibrillation  - Sudden cardiac death     SOCIAL HISTORY:  Social History     Tobacco Use     Smoking status: Never Smoker     Smokeless tobacco: Never Used   Substance Use Topics     Alcohol use: None     Drug use: None       ROS:   12 points of ROS were reviewed.  Constitutional: No fever, chills, or sweats. Weight stable.   ENT: No visual disturbance, ear ache, epistaxis, sore throat.   Cardiovascular: As per HPI.   Respiratory: No cough, hemoptysis.    GI: No nausea, vomiting, hematemesis, melena, or hematochezia.   : No hematuria.   Integument: Negative.   Psychiatric: Negative.   Hematologic:  Easy bruising, no easy bleeding.  Neuro: Negative.   Endocrinology: No significant heat or cold intolerance   Musculoskeletal: No myalgia.    Exam:  GENERAL: Healthy, alert and no distress  EYES:  Eyes grossly normal to inspection.  No discharge or erythema, or obvious scleral/conjunctival abnormalities.  RESP: No audible wheeze, cough, or visible cyanosis.  No visible retractions or increased work of breathing.    SKIN: Visible skin clear. No significant rash, abnormal pigmentation or lesions.  NEURO: Cranial nerves grossly intact.  Mentation and speech appropriate for age.  PSYCH: Mentation appears normal, affect normal/bright, judgement and insight intact, normal speech and appearance well-groomed.    Labs:  CBC RESULTS:   No results found for: WBC, RBC, HGB, HCT, MCV, MCH, MCHC, RDW, PLT    BMP RESULTS:  No results found for: NA, POTASSIUM, CHLORIDE, CO2, ANIONGAP, GLC, BUN, CR, GFRESTIMATED, GFRESTBLACK, GABRIELA     INR RESULTS:  No results found for: INR    Procedures:  Biotel monitor in 3/2021: Reviewed.            Stree TTE on 3/2/2021: Reviewed.  WNL.    Assessment and Plan:  # PVCs and NSVTs  PVC burden = 2% per Biotel monitor.  PVCs and VTs are likely originating from the same focus.   Because the patient cannot tolerate a high dose of Metoprolol to control her PVCs and VTs, she would be a candidate for catheter ablation of PVCs and VTs.  The nature, risks, benefits, alternatives and anticipated results of the procedure were explained to the patient. These risks include but are not limited to local vascular damage, bleeding, tamponade and stroke. After careful consideration the patient wished to proceed with the procedure. The patient was verbally consented. We will schedule the patient to have this done at her earliest convenience.    I spent a total of 88 min to review the records, see the patient, and complete the documents.       CC  Patient Care Team:  Stormy Oliveros, RN as Specialty Care Coordinator (Clinical Cardiac Electrophysiology)  David Berry MD as MD (Clinical Cardiac Electrophysiology)  Beth Estevez RN as Specialty Care Coordinator (Cardiology)  STORM TSAI  do not hesitate to contact me if you have any questions/concerns.     Sincerely,     Papa Alas MD

## 2021-04-14 DIAGNOSIS — I47.29 NONSUSTAINED VENTRICULAR TACHYCARDIA (H): ICD-10-CM

## 2021-04-14 DIAGNOSIS — Z11.59 ENCOUNTER FOR SCREENING FOR OTHER VIRAL DISEASES: ICD-10-CM

## 2021-04-14 PROBLEM — I47.20 PAROXYSMAL VENTRICULAR TACHYCARDIA (H): Status: ACTIVE | Noted: 2021-04-14

## 2021-04-14 RX ORDER — SODIUM CHLORIDE 9 MG/ML
INJECTION, SOLUTION INTRAVENOUS CONTINUOUS
Status: CANCELLED | OUTPATIENT
Start: 2021-04-14

## 2021-04-14 RX ORDER — LIDOCAINE 40 MG/G
CREAM TOPICAL
Status: CANCELLED | OUTPATIENT
Start: 2021-04-14

## 2021-04-18 ENCOUNTER — HEALTH MAINTENANCE LETTER (OUTPATIENT)
Age: 36
End: 2021-04-18

## 2021-04-20 ENCOUNTER — AMBULATORY - HEALTHEAST (OUTPATIENT)
Dept: FAMILY MEDICINE | Facility: CLINIC | Age: 36
End: 2021-04-20

## 2021-04-20 DIAGNOSIS — Z11.59 ENCOUNTER FOR SCREENING FOR OTHER VIRAL DISEASES: ICD-10-CM

## 2021-04-23 ENCOUNTER — AMBULATORY - HEALTHEAST (OUTPATIENT)
Dept: LAB | Facility: CLINIC | Age: 36
End: 2021-04-23

## 2021-04-23 ENCOUNTER — PATIENT OUTREACH (OUTPATIENT)
Dept: CARDIOLOGY | Facility: CLINIC | Age: 36
End: 2021-04-23

## 2021-04-23 DIAGNOSIS — Z11.59 ENCOUNTER FOR SCREENING FOR OTHER VIRAL DISEASES: ICD-10-CM

## 2021-04-23 NOTE — TELEPHONE ENCOUNTER
Was notified by EP  that patient had a $9000 bill from [employer] for event monitor she wore.     Called and spoke to patient. This was an EOB that states [employer] was an out of network provider, so the charges are based on out of network coverage at a $9000 starting point. She will owe $1500 per the EOB. She feels [facility] shouldn't be considered out of network, since [facility] is in-network with her plan. Encouraged patient to either wait for the true bill from [employer], or call [employer]'s billing office to further discuss her bill. She verbalized agreement and will call [employer].     Will consider different monitor if Dr Alas wants monitor post ablation.

## 2021-04-24 LAB
SARS-COV-2 PCR COMMENT: NORMAL
SARS-COV-2 RNA SPEC QL NAA+PROBE: NEGATIVE
SARS-COV-2 VIRUS SPECIMEN SOURCE: NORMAL

## 2021-04-25 ENCOUNTER — COMMUNICATION - HEALTHEAST (OUTPATIENT)
Dept: SCHEDULING | Facility: CLINIC | Age: 36
End: 2021-04-25

## 2021-04-26 ENCOUNTER — TELEPHONE (OUTPATIENT)
Dept: CARDIOLOGY | Facility: CLINIC | Age: 36
End: 2021-04-26

## 2021-04-26 NOTE — TELEPHONE ENCOUNTER
Left voicemail with negative covid results. Plan to proceed with ablation tomorrow as scheduled.

## 2021-04-26 NOTE — TELEPHONE ENCOUNTER
Health Call Center    Phone Message    May a detailed message be left on voicemail: yes     Reason for Call: Other: Karina calling to report that she has not received her COVID test results back from [facility]. She had her test on Friday at 11:00am. Writer suggested that Karina calls the clinic to see if they are able to give her results over the phone. Pleaes give Karina a call if there are any other questions or concerns. Thanks!     Action Taken: Message routed to:  [facility] ([facility] Cardio    Travel Screening: Not Applicable

## 2021-04-27 ENCOUNTER — APPOINTMENT (OUTPATIENT)
Dept: MEDSURG UNIT | Facility: CLINIC | Age: 36
End: 2021-04-27
Attending: INTERNAL MEDICINE
Payer: COMMERCIAL

## 2021-04-27 ENCOUNTER — HOSPITAL ENCOUNTER (OUTPATIENT)
Facility: CLINIC | Age: 36
Discharge: HOME | End: 2021-04-27
Attending: INTERNAL MEDICINE | Admitting: INTERNAL MEDICINE
Payer: COMMERCIAL

## 2021-04-27 ENCOUNTER — APPOINTMENT (OUTPATIENT)
Dept: LAB | Facility: CLINIC | Age: 36
End: 2021-04-27
Attending: INTERNAL MEDICINE
Payer: COMMERCIAL

## 2021-04-27 VITALS
WEIGHT: 215 LBS | HEIGHT: 70 IN | BODY MASS INDEX: 30.78 KG/M2 | OXYGEN SATURATION: 96 % | DIASTOLIC BLOOD PRESSURE: 87 MMHG | TEMPERATURE: 98.6 F | HEART RATE: 84 BPM | SYSTOLIC BLOOD PRESSURE: 118 MMHG | RESPIRATION RATE: 16 BRPM

## 2021-04-27 DIAGNOSIS — I47.29 PAROXYSMAL VENTRICULAR TACHYCARDIA (H): ICD-10-CM

## 2021-04-27 LAB
ANION GAP SERPL CALCULATED.3IONS-SCNC: 6 MMOL/L (ref 3–14)
B-HCG SERPL-ACNC: 3 IU/L (ref 0–5)
BUN SERPL-MCNC: 10 MG/DL (ref 7–30)
CALCIUM SERPL-MCNC: 9 MG/DL (ref 8.5–10.1)
CHLORIDE SERPL-SCNC: 108 MMOL/L (ref 94–109)
CO2 SERPL-SCNC: 25 MMOL/L (ref 20–32)
CREAT SERPL-MCNC: 0.67 MG/DL (ref 0.52–1.04)
ERYTHROCYTE [DISTWIDTH] IN BLOOD BY AUTOMATED COUNT: 12.4 % (ref 10–15)
GFR SERPL CREATININE-BSD FRML MDRD: >90 ML/MIN/{1.73_M2}
GLUCOSE SERPL-MCNC: 93 MG/DL (ref 70–99)
HCT VFR BLD AUTO: 38.7 % (ref 35–47)
HGB BLD-MCNC: 13.7 G/DL (ref 11.7–15.7)
MCH RBC QN AUTO: 31.1 PG (ref 26.5–33)
MCHC RBC AUTO-ENTMCNC: 35.4 G/DL (ref 31.5–36.5)
MCV RBC AUTO: 88 FL (ref 78–100)
PLATELET # BLD AUTO: 221 10E9/L (ref 150–450)
POTASSIUM SERPL-SCNC: 4.1 MMOL/L (ref 3.4–5.3)
RBC # BLD AUTO: 4.4 10E12/L (ref 3.8–5.2)
SODIUM SERPL-SCNC: 138 MMOL/L (ref 133–144)
WBC # BLD AUTO: 7.5 10E9/L (ref 4–11)

## 2021-04-27 PROCEDURE — 93005 ELECTROCARDIOGRAM TRACING: CPT

## 2021-04-27 PROCEDURE — 80048 BASIC METABOLIC PNL TOTAL CA: CPT | Performed by: INTERNAL MEDICINE

## 2021-04-27 PROCEDURE — 258N000003 HC RX IP 258 OP 636: Performed by: STUDENT IN AN ORGANIZED HEALTH CARE EDUCATION/TRAINING PROGRAM

## 2021-04-27 PROCEDURE — 999N000054 HC STATISTIC EKG NON-CHARGEABLE

## 2021-04-27 PROCEDURE — C1733 CATH, EP, OTHR THAN COOL-TIP: HCPCS | Performed by: INTERNAL MEDICINE

## 2021-04-27 PROCEDURE — 250N000009 HC RX 250: Performed by: INTERNAL MEDICINE

## 2021-04-27 PROCEDURE — 99152 MOD SED SAME PHYS/QHP 5/>YRS: CPT | Performed by: INTERNAL MEDICINE

## 2021-04-27 PROCEDURE — 250N000011 HC RX IP 250 OP 636: Performed by: INTERNAL MEDICINE

## 2021-04-27 PROCEDURE — 93654 COMPRE EP EVAL TX VT: CPT | Mod: GC | Performed by: INTERNAL MEDICINE

## 2021-04-27 PROCEDURE — 93623 PRGRMD STIMJ&PACG IV RX NFS: CPT | Performed by: INTERNAL MEDICINE

## 2021-04-27 PROCEDURE — 272N000001 HC OR GENERAL SUPPLY STERILE: Performed by: INTERNAL MEDICINE

## 2021-04-27 PROCEDURE — 85027 COMPLETE CBC AUTOMATED: CPT | Performed by: INTERNAL MEDICINE

## 2021-04-27 PROCEDURE — C1732 CATH, EP, DIAG/ABL, 3D/VECT: HCPCS | Performed by: INTERNAL MEDICINE

## 2021-04-27 PROCEDURE — 93654 COMPRE EP EVAL TX VT: CPT | Performed by: INTERNAL MEDICINE

## 2021-04-27 PROCEDURE — 999N000142 HC STATISTIC PROCEDURE PREP ONLY

## 2021-04-27 PROCEDURE — 84702 CHORIONIC GONADOTROPIN TEST: CPT | Performed by: INTERNAL MEDICINE

## 2021-04-27 PROCEDURE — 250N000011 HC RX IP 250 OP 636: Performed by: STUDENT IN AN ORGANIZED HEALTH CARE EDUCATION/TRAINING PROGRAM

## 2021-04-27 PROCEDURE — C1894 INTRO/SHEATH, NON-LASER: HCPCS | Performed by: INTERNAL MEDICINE

## 2021-04-27 PROCEDURE — 36415 COLL VENOUS BLD VENIPUNCTURE: CPT | Performed by: INTERNAL MEDICINE

## 2021-04-27 PROCEDURE — 99153 MOD SED SAME PHYS/QHP EA: CPT | Performed by: INTERNAL MEDICINE

## 2021-04-27 PROCEDURE — 99152 MOD SED SAME PHYS/QHP 5/>YRS: CPT | Mod: 59 | Performed by: INTERNAL MEDICINE

## 2021-04-27 PROCEDURE — C1730 CATH, EP, 19 OR FEW ELECT: HCPCS | Performed by: INTERNAL MEDICINE

## 2021-04-27 PROCEDURE — 93010 ELECTROCARDIOGRAM REPORT: CPT | Mod: 76 | Performed by: INTERNAL MEDICINE

## 2021-04-27 PROCEDURE — 93623 PRGRMD STIMJ&PACG IV RX NFS: CPT | Mod: 26 | Performed by: INTERNAL MEDICINE

## 2021-04-27 RX ORDER — NALOXONE HYDROCHLORIDE 0.4 MG/ML
0.2 INJECTION, SOLUTION INTRAMUSCULAR; INTRAVENOUS; SUBCUTANEOUS
Status: DISCONTINUED | OUTPATIENT
Start: 2021-04-27 | End: 2021-04-27 | Stop reason: HOSPADM

## 2021-04-27 RX ORDER — NALOXONE HYDROCHLORIDE 0.4 MG/ML
0.2 INJECTION, SOLUTION INTRAMUSCULAR; INTRAVENOUS; SUBCUTANEOUS
Status: DISCONTINUED | OUTPATIENT
Start: 2021-04-27 | End: 2021-04-27

## 2021-04-27 RX ORDER — NALOXONE HYDROCHLORIDE 0.4 MG/ML
0.4 INJECTION, SOLUTION INTRAMUSCULAR; INTRAVENOUS; SUBCUTANEOUS
Status: DISCONTINUED | OUTPATIENT
Start: 2021-04-27 | End: 2021-04-27

## 2021-04-27 RX ORDER — CEFAZOLIN SODIUM 1 G/3ML
1 INJECTION, POWDER, FOR SOLUTION INTRAMUSCULAR; INTRAVENOUS
Status: DISCONTINUED | OUTPATIENT
Start: 2021-04-27 | End: 2021-04-27 | Stop reason: HOSPADM

## 2021-04-27 RX ORDER — PHENYLEPHRINE HYDROCHLORIDE 10 MG/ML
INJECTION INTRAVENOUS
Status: DISCONTINUED | OUTPATIENT
Start: 2021-04-27 | End: 2021-04-27 | Stop reason: HOSPADM

## 2021-04-27 RX ORDER — FENTANYL CITRATE 50 UG/ML
25 INJECTION, SOLUTION INTRAMUSCULAR; INTRAVENOUS EVERY 5 MIN PRN
Status: DISCONTINUED | OUTPATIENT
Start: 2021-04-27 | End: 2021-04-27 | Stop reason: HOSPADM

## 2021-04-27 RX ORDER — NALOXONE HYDROCHLORIDE 0.4 MG/ML
0.4 INJECTION, SOLUTION INTRAMUSCULAR; INTRAVENOUS; SUBCUTANEOUS
Status: DISCONTINUED | OUTPATIENT
Start: 2021-04-27 | End: 2021-04-27 | Stop reason: HOSPADM

## 2021-04-27 RX ORDER — LIDOCAINE 40 MG/G
CREAM TOPICAL
Status: DISCONTINUED | OUTPATIENT
Start: 2021-04-27 | End: 2021-04-27 | Stop reason: HOSPADM

## 2021-04-27 RX ORDER — ONDANSETRON 2 MG/ML
INJECTION INTRAMUSCULAR; INTRAVENOUS
Status: DISCONTINUED | OUTPATIENT
Start: 2021-04-27 | End: 2021-04-27 | Stop reason: HOSPADM

## 2021-04-27 RX ORDER — FENTANYL CITRATE 50 UG/ML
50 INJECTION, SOLUTION INTRAMUSCULAR; INTRAVENOUS
Status: DISCONTINUED | OUTPATIENT
Start: 2021-04-27 | End: 2021-04-27 | Stop reason: HOSPADM

## 2021-04-27 RX ORDER — DOBUTAMINE HYDROCHLORIDE 200 MG/100ML
5-40 INJECTION INTRAVENOUS CONTINUOUS PRN
Status: DISCONTINUED | OUTPATIENT
Start: 2021-04-27 | End: 2021-04-27 | Stop reason: HOSPADM

## 2021-04-27 RX ORDER — FENTANYL CITRATE 50 UG/ML
INJECTION, SOLUTION INTRAMUSCULAR; INTRAVENOUS
Status: DISCONTINUED | OUTPATIENT
Start: 2021-04-27 | End: 2021-04-27 | Stop reason: HOSPADM

## 2021-04-27 RX ORDER — SODIUM CHLORIDE 9 MG/ML
INJECTION, SOLUTION INTRAVENOUS CONTINUOUS
Status: DISCONTINUED | OUTPATIENT
Start: 2021-04-27 | End: 2021-04-27 | Stop reason: HOSPADM

## 2021-04-27 RX ADMIN — ISOPROTERENOL HYDROCHLORIDE 2 MCG/MIN: 0.2 INJECTION, SOLUTION INTRAMUSCULAR; INTRAVENOUS at 10:01

## 2021-04-27 ASSESSMENT — MIFFLIN-ST. JEOR: SCORE: 1745.48

## 2021-04-27 NOTE — PLAN OF CARE
1530 on. Off bedrest walked, voided, groin is tender to palpation but soft and flat with few old drops of blood on dressing, form reviewed by day RN. I explained that Lido probably wore off and lots of site manipulation with an EP, so it does get sore. RN to bring to front to get in ride to go home. She has no meds to  and has all beloningings

## 2021-04-27 NOTE — PRE-PROCEDURE
GENERAL PRE-PROCEDURE:   Date/Time:  4/27/2021 9:00 AM    Verbal consent obtained?: Yes    Written consent obtained?: Yes    Risks and benefits: Risks, benefits and alternatives were discussed    Consent given by:  Patient  Patient states understanding of procedure being performed: Yes    Patient's understanding of procedure matches consent: Yes    Procedure consent matches procedure scheduled: Yes    Expected level of sedation:  Moderate  Appropriately NPO:  Yes  ASA Class:  Class 1- healthy patient  Mallampati  :  Grade 1- soft palate, uvula, tonsillar pillars, and posterior pharyngeal wall visible  History & Physical reviewed:  History and physical reviewed and no updates needed  Statement of review:  I have reviewed the lab findings, diagnostic data, medications, and the plan for sedation

## 2021-04-27 NOTE — Clinical Note
Potential access sites were evaluated for patency using ultrasound.   The right femoral artery and right femoral vein were selected. Access was obtained under with Sonosite and Fluoroscopic guidance using a micropuncture 21 guage needle with direct visualization of needle entry.

## 2021-04-27 NOTE — H&P
Cardiac Electrophysiology - Pre-procedure History & Physical      Procedure:  VT ablation    HPI:  34 yo female with recurrent symptomatic (lightheadedness; no syncope or arrest) VT despite metoprolol in the absence of any structural heart disease, thus referred for catheter ablation.  A 12 lead ECG of the VT is not available for review but the Biotel monitor showed a wide complex during tachycardia, making an outflow tract tachycardia more likely than an idiopathic fascicular VT.  She feels well this morning and denies lightheadedness, chest pain, dyspnea, syncope, and peripheral edema.  She denies recent fever & chills.    No current facility-administered medications on file prior to encounter.   Multiple Vitamins-Minerals (MULTIVITAMIN WOMEN PO), Take 1 tablet by mouth daily       Allergies   Allergen Reactions     Sulfa Drugs Rash     Allergic since childhood         Past Medical History:   Diagnosis Date     VT (ventricular tachycardia) (H)      Past Surgical History:   Procedure Laterality Date     wisdom teeth removal       History reviewed. No pertinent family history.  Social History     Socioeconomic History     Marital status:      Spouse name: Not on file     Number of children: Not on file     Years of education: Not on file     Highest education level: Not on file   Occupational History     Not on file   Social Needs     Financial resource strain: Not on file     Food insecurity     Worry: Not on file     Inability: Not on file     Transportation needs     Medical: Not on file     Non-medical: Not on file   Tobacco Use     Smoking status: Never Smoker     Smokeless tobacco: Never Used   Substance and Sexual Activity     Alcohol use: Not on file     Comment: rarely     Drug use: Not on file     Sexual activity: Not on file   Lifestyle     Physical activity     Days per week: Not on file     Minutes per session: Not on file     Stress: Not on file   Relationships     Social connections      "Talks on phone: Not on file     Gets together: Not on file     Attends Taoism service: Not on file     Active member of club or organization: Not on file     Attends meetings of clubs or organizations: Not on file     Relationship status: Not on file     Intimate partner violence     Fear of current or ex partner: Not on file     Emotionally abused: Not on file     Physically abused: Not on file     Forced sexual activity: Not on file   Other Topics Concern     Parent/sibling w/ CABG, MI or angioplasty before 65F 55M? Not Asked   Social History Narrative     Not on file     A complete review of systems is negative except as noted above in the HPI.    Physical Examination:  Vitals: /79   Pulse 85   Temp 98.7  F (37.1  C) (Oral)   Resp 16   Ht 1.778 m (5' 10\")   Wt 97.5 kg (215 lb)   SpO2 98%   BMI 30.85 kg/m    GENERAL APPEARANCE: comfortable appearing female, lying in gurney conversing with her .  HEENT: no scleral icterus  NECK:  JVP is not elevated  CHEST: equal chest rise, unlabored breathing at rest.  CARDIOVASCULAR:  RRR, warm extremities, no peripheral edema.  ABDOMEN: soft, not tender.  NEURO: alert and fully oriented, fluent speech.  SKIN: not jaundiced    ROUTINE ICU LABS (Last four results)  CMP  Recent Labs   Lab 04/27/21  0719      POTASSIUM 4.1   CHLORIDE 108   CO2 25   ANIONGAP 6   GLC 93   BUN 10   CR 0.67   GFRESTIMATED >90   GFRESTBLACK >90   GABRIELA 9.0     CBC  Recent Labs   Lab 04/27/21  0719   WBC 7.5   RBC 4.40   HGB 13.7   HCT 38.7   MCV 88   MCH 31.1   MCHC 35.4   RDW 12.4        Echo performed at a facility in [place] showed normal biventricular systolic function and no significant valvular abnormalities.    12 lead ECG today shows sinus rhythm at 87 bpm, normal axis, narrow QRS, abnormal repolarization in the inferior leads but otherwise normal.      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  We will proceed with EPS " & ablation today as planned.    I discussed the patient with Dr. Alas.    Mario Harkins MD  Cardiac electrophysiology fellow    Addendum:  I saw and evaluated the patient and agree with the fellow s finding and plans as written.  Papa Alas MD

## 2021-04-27 NOTE — DISCHARGE INSTRUCTIONS
1  Stop taking your metoprolol.  2  We will have you wear a heart monitor called a Zio Patch in 2 months.  It is worn for 2 weeks and then mailed back to us.  3  We will work with you to schedule an appointment with Dr. Alas in 3 months to follow up on how you are doing and the results of the heart monitor.

## 2021-04-27 NOTE — PROGRESS NOTES
D/I/A: Pt roomed on 3C in [address].  Arrived via litter and accompanied by cath lab RN On/Off: Off monitor.  VSSA.  Rhythm upon arrival SR on monitor.  Denies pain or sob.  Reviewed activity restrictions and when to notify RN, ie-changes to breathing or increased chest pressure or chest pain.  CCL access:  R groin site C/D/I, no bleeding/hematoma, good distal pulse, CMS intact.  P: Continue to monitor status.  Discharge to home once meeting criteria.      Emeli Ortiz RN

## 2021-04-28 LAB
INTERPRETATION ECG - MUSE: NORMAL
INTERPRETATION ECG - MUSE: NORMAL

## 2021-05-03 ENCOUNTER — PATIENT OUTREACH (OUTPATIENT)
Dept: CARDIOLOGY | Facility: CLINIC | Age: 36
End: 2021-05-03

## 2021-05-03 NOTE — TELEPHONE ENCOUNTER
"    Post-Procedure Follow-up Phone Call (Electrophysiology)    Procedure:  Successful ablation of likely idiopathic PVC and VT exiting from the anterior and posterior aspects of the septal RVOT.  Date of Procedure: 4/27/21  Physician: Dr Evette RIOS Groin Site       -Bandage removed and is ANDERSON.  -Denies bruising, denies lump, or hardness at site.  -Not red, hot, tender, or swollen. Denies fever, chills.  - Denies the presence of fluid or blood draining from site.  - Denies numb, cool, or blue extremities.     Symptoms    -Denies chest pain and/or shortness of breath.   - Rhythm: No symptoms r/t PVCs or VT   - \"Twinge in chest every now and then\"   Education Post discharge instructions reviewed.    New Discharge Medications & Orders   1. Discontinue metoprolol - patient is already off it.   2. Cardiac MRI outpatient to evaluate for arrhythmogenic cardiomyopathy - will have EP  call to arrange.  3. Follow up with Dr. Alas in 3 months after wearing a 2 weeks zio patch in 2 months (EP  to mail out)   Appointments 8/6/21 Dr Alas     Patient Comments      Patient verbalized understanding of information and will call with any other questions at [phone] or send a eParachute msg.       "

## 2021-07-01 ENCOUNTER — ALLIED HEALTH/NURSE VISIT (OUTPATIENT)
Dept: CARDIOLOGY | Facility: CLINIC | Age: 36
End: 2021-07-01
Attending: INTERNAL MEDICINE
Payer: COMMERCIAL

## 2021-07-01 DIAGNOSIS — I47.29 PAROXYSMAL VENTRICULAR TACHYCARDIA (H): ICD-10-CM

## 2021-07-06 PROCEDURE — 93248 EXT ECG>7D<15D REV&INTERPJ: CPT | Performed by: INTERNAL MEDICINE

## 2021-08-06 ENCOUNTER — OFFICE VISIT (OUTPATIENT)
Dept: CARDIOLOGY | Facility: CLINIC | Age: 36
End: 2021-08-06
Attending: INTERNAL MEDICINE
Payer: COMMERCIAL

## 2021-08-06 VITALS
OXYGEN SATURATION: 99 % | WEIGHT: 228 LBS | DIASTOLIC BLOOD PRESSURE: 84 MMHG | BODY MASS INDEX: 32.71 KG/M2 | SYSTOLIC BLOOD PRESSURE: 128 MMHG | HEART RATE: 93 BPM

## 2021-08-06 DIAGNOSIS — I49.3 PVC'S (PREMATURE VENTRICULAR CONTRACTIONS): Primary | ICD-10-CM

## 2021-08-06 DIAGNOSIS — I47.29 PAROXYSMAL VENTRICULAR TACHYCARDIA (H): ICD-10-CM

## 2021-08-06 PROCEDURE — 93005 ELECTROCARDIOGRAM TRACING: CPT

## 2021-08-06 PROCEDURE — G0463 HOSPITAL OUTPT CLINIC VISIT: HCPCS | Mod: 25

## 2021-08-06 PROCEDURE — 99214 OFFICE O/P EST MOD 30 MIN: CPT | Performed by: INTERNAL MEDICINE

## 2021-08-06 ASSESSMENT — PAIN SCALES - GENERAL: PAINLEVEL: NO PAIN (0)

## 2021-08-06 NOTE — PATIENT INSTRUCTIONS
Plan:     Call us if you feel that your PVCs have increased in frequency    Follow-up in 6 months      Your Care Team:  EP Cardiology   Telephone Number     Beth Estevez RN [phone]     For scheduling appts or procedures:    Gudelia Macdonald   [phone]   For the Device Clinic (Pacemakers, ICDs, Loop Recorders)    During business hours: [phone]  After business hours:   [phone]- select option 4 and ask for job code [phone].       Cardiovascular Clinic:   [address] [place] [address]. [place] [address]      As always, Thank you for trusting us with your health care needs!

## 2021-08-06 NOTE — LETTER
8/6/2021      RE: Karina Tubbs  [address] [facility] [address]       Dear Colleague,    Thank you for the opportunity to participate in the care of your patient, Karina Tubbs, at the [facility] HEART CLINIC [facility]. Please see a copy of my visit note below.    HPI:  Mrs. Tubbs is a 35 yo female with a PMH of PVCs and VT.  She was referred to consider whether she might be a candidate for catheter ablation of PVCs and VTs.  She has been healthy until early 2021 when she visited [place].  At that time, she developed sunburn and visited a walk-in clinic where she was found to PVCs and VTs. She was also found to have low potassium. Even after potassium level was corrected, she continued to have PVCs and VTs. In [place] she was initially started on amiodarone drip but this was ultimately terminated and she was sent out with metoprolol 25 mg twice daily.  Since then it has been increased to 50 mg twice daily and appears to be controlling her symptoms quite nicely.  However, she could not tolerate such a big dose of Metoprolol.  She complained of palpitation, bur denied any chest pain, SOB or dizziness.   She then underwent catheter ablation of PVC and VT exiting from the anterior and posterior aspects of the septal RVOT on 4/27/2021.  Since then she has been doing well, and is now seen for a follow up.    PAST MEDICAL HISTORY:  Past Medical History:   Diagnosis Date     VT (ventricular tachycardia) (H)        CURRENT MEDICATIONS:  Current Outpatient Medications   Medication Sig Dispense Refill     Multiple Vitamins-Minerals (MULTIVITAMIN WOMEN PO) Take 1 tablet by mouth daily          PAST SURGICAL HISTORY:  Past Surgical History:   Procedure Laterality Date     EP ABLATION VT/PVC N/A 4/27/2021    Procedure: EP ABLATION VT;  Surgeon: Papa Alas MD;  Location:  HEART CARDIAC CATH LAB     wisdom teeth removal         ALLERGIES:     Allergies    Allergen Reactions     Sulfa Drugs Rash     Allergic since childhood         FAMILY HISTORY:  - Premature coronary artery disease  - Atrial fibrillation  - Sudden cardiac death     SOCIAL HISTORY:  Social History     Tobacco Use     Smoking status: Never Smoker     Smokeless tobacco: Never Used   Substance Use Topics     Alcohol use: Not on file     Comment: rarely     Drug use: Not on file       ROS:   12 points of ROS were reviewed.  Constitutional: No fever, chills, or sweats. Weight stable.   ENT: No visual disturbance, ear ache, epistaxis, sore throat.   Cardiovascular: As per HPI.   Respiratory: No cough, hemoptysis.    GI: No nausea, vomiting, hematemesis, melena, or hematochezia.   : No hematuria.   Integument: Negative.   Psychiatric: Negative.   Hematologic:  Easy bruising, no easy bleeding.  Neuro: Negative.   Endocrinology: No significant heat or cold intolerance   Musculoskeletal: No myalgia.    Exam:  GENERAL: Healthy, alert and no distress  EYES: Eyes grossly normal to inspection.  No discharge or erythema, or obvious scleral/conjunctival abnormalities.  RESP: No audible wheeze, cough, or visible cyanosis.  No visible retractions or increased work of breathing.    SKIN: Visible skin clear. No significant rash, abnormal pigmentation or lesions.  NEURO: Cranial nerves grossly intact.  Mentation and speech appropriate for age.  PSYCH: Mentation appears normal, affect normal/bright, judgement and insight intact, normal speech and appearance well-groomed.    Labs:  CBC RESULTS:   Lab Results   Component Value Date    WBC 7.5 04/27/2021    RBC 4.40 04/27/2021    HGB 13.7 04/27/2021    HCT 38.7 04/27/2021    MCV 88 04/27/2021    MCH 31.1 04/27/2021    MCHC 35.4 04/27/2021    RDW 12.4 04/27/2021     04/27/2021       BMP RESULTS:  Lab Results   Component Value Date     04/27/2021    POTASSIUM 4.1 04/27/2021    CHLORIDE 108 04/27/2021    CO2 25 04/27/2021    ANIONGAP 6 04/27/2021    GLC 93  04/27/2021    BUN 10 04/27/2021    CR 0.67 04/27/2021    GFRESTIMATED >90 04/27/2021    GFRESTBLACK >90 04/27/2021    GABRIELA 9.0 04/27/2021        INR RESULTS:  No results found for: INR    Procedures:  Biotel monitor in 3/2021: Reviewed.            Strss TTE on 3/2/2021: Reviewed.  WNL.    ECG on 8/6/2021: Reviewed.  Sinus rhythm with occasional PVCs originating from the OT.      Zio patch in 7/2021: Reviewed.    Assessment and Plan:  # PVCs and NSVTs  PVC burden = 2% per Biotel monitor.  # s/p catheter ablation of PVC and VT exiting from the anterior and posterior aspects of the septal RVOT on 4/27/2021.  The ablation appears to be successful for VT although her PVC burden remained the same as before the ablation.  The remaining PVCs are likely to originate from the RVOT.  I advised her to continue monitoring her PVC symptoms and call us if she starts feeling more PVCs.  Otherwise, I will see her back in 6 months.    I spent a total of 35 min today to review the records, see the patient, and complete the documents.    CC  Patient Care Team:    Stormy Oliveros, RN as Specialty Care Coordinator (Clinical Cardiac Electrophysiology)  David Berry MD as MD (Clinical Cardiac Electrophysiology)  Beth Estevez RN as Specialty Care Coordinator (Cardiology)  STORM TSAI

## 2021-08-06 NOTE — PROGRESS NOTES
HPI:  Mrs. Tubbs is a 35 yo female with a PMH of PVCs and VT.  She was referred to consider whether she might be a candidate for catheter ablation of PVCs and VTs.  She has been healthy until early 2021 when she visited [place].  At that time, she developed sunburn and visited a walk-in clinic where she was found to PVCs and VTs. She was also found to have low potassium. Even after potassium level was corrected, she continued to have PVCs and VTs. In [place] she was initially started on amiodarone drip but this was ultimately terminated and she was sent out with metoprolol 25 mg twice daily.  Since then it has been increased to 50 mg twice daily and appears to be controlling her symptoms quite nicely.  However, she could not tolerate such a big dose of Metoprolol.  She complained of palpitation, bur denied any chest pain, SOB or dizziness.   She then underwent catheter ablation of PVC and VT exiting from the anterior and posterior aspects of the septal RVOT on 4/27/2021.  Since then she has been doing well, and is now seen for a follow up.    PAST MEDICAL HISTORY:  Past Medical History:   Diagnosis Date     VT (ventricular tachycardia) (H)        CURRENT MEDICATIONS:  Current Outpatient Medications   Medication Sig Dispense Refill     Multiple Vitamins-Minerals (MULTIVITAMIN WOMEN PO) Take 1 tablet by mouth daily          PAST SURGICAL HISTORY:  Past Surgical History:   Procedure Laterality Date     EP ABLATION VT/PVC N/A 4/27/2021    Procedure: EP ABLATION VT;  Surgeon: Papa Alas MD;  Location:  HEART CARDIAC CATH LAB     wisdom teeth removal         ALLERGIES:     Allergies   Allergen Reactions     Sulfa Drugs Rash     Allergic since childhood         FAMILY HISTORY:  - Premature coronary artery disease  - Atrial fibrillation  - Sudden cardiac death     SOCIAL HISTORY:  Social History     Tobacco Use     Smoking status: Never Smoker     Smokeless tobacco: Never Used   Substance Use Topics     Alcohol use:  Not on file     Comment: rarely     Drug use: Not on file       ROS:   12 points of ROS were reviewed.  Constitutional: No fever, chills, or sweats. Weight stable.   ENT: No visual disturbance, ear ache, epistaxis, sore throat.   Cardiovascular: As per HPI.   Respiratory: No cough, hemoptysis.    GI: No nausea, vomiting, hematemesis, melena, or hematochezia.   : No hematuria.   Integument: Negative.   Psychiatric: Negative.   Hematologic:  Easy bruising, no easy bleeding.  Neuro: Negative.   Endocrinology: No significant heat or cold intolerance   Musculoskeletal: No myalgia.    Exam:  GENERAL: Healthy, alert and no distress  EYES: Eyes grossly normal to inspection.  No discharge or erythema, or obvious scleral/conjunctival abnormalities.  RESP: No audible wheeze, cough, or visible cyanosis.  No visible retractions or increased work of breathing.    SKIN: Visible skin clear. No significant rash, abnormal pigmentation or lesions.  NEURO: Cranial nerves grossly intact.  Mentation and speech appropriate for age.  PSYCH: Mentation appears normal, affect normal/bright, judgement and insight intact, normal speech and appearance well-groomed.    Labs:  CBC RESULTS:   Lab Results   Component Value Date    WBC 7.5 04/27/2021    RBC 4.40 04/27/2021    HGB 13.7 04/27/2021    HCT 38.7 04/27/2021    MCV 88 04/27/2021    MCH 31.1 04/27/2021    MCHC 35.4 04/27/2021    RDW 12.4 04/27/2021     04/27/2021       BMP RESULTS:  Lab Results   Component Value Date     04/27/2021    POTASSIUM 4.1 04/27/2021    CHLORIDE 108 04/27/2021    CO2 25 04/27/2021    ANIONGAP 6 04/27/2021    GLC 93 04/27/2021    BUN 10 04/27/2021    CR 0.67 04/27/2021    GFRESTIMATED >90 04/27/2021    GFRESTBLACK >90 04/27/2021    GABRIELA 9.0 04/27/2021        INR RESULTS:  No results found for: INR    Procedures:  Biotel monitor in 3/2021: Reviewed.            Strss TTE on 3/2/2021: Reviewed.  WNL.    ECG on 8/6/2021: Reviewed.  Sinus rhythm with  occasional PVCs originating from the OT.      Zio patch in 7/2021: Reviewed.    Assessment and Plan:  # PVCs and NSVTs  PVC burden = 2% per Biotel monitor.  # s/p catheter ablation of PVC and VT exiting from the anterior and posterior aspects of the septal RVOT on 4/27/2021.  The ablation appears to be successful for VT although her PVC burden remained the same as before the ablation.  The remaining PVCs are likely to originate from the RVOT.  I advised her to continue monitoring her PVC symptoms and call us if she starts feeling more PVCs.  Otherwise, I will see her back in 6 months.    I spent a total of 35 min today to review the records, see the patient, and complete the documents.    CC  Patient Care Team:  No Ref-Primary, Physician as PCP - General  Stormy Oliveros, RN as Specialty Care Coordinator (Clinical Cardiac Electrophysiology)  David Berry MD as MD (Clinical Cardiac Electrophysiology)  Beth Estevez, RN as Specialty Care Coordinator (Cardiology)  Papa Alas MD (Cardiovascular Disease)  Papa Alas MD as Assigned Heart and Vascular Provider  STORM TSAI

## 2021-08-07 LAB
ATRIAL RATE - MUSE: 89 BPM
DIASTOLIC BLOOD PRESSURE - MUSE: NORMAL MMHG
INTERPRETATION ECG - MUSE: NORMAL
P AXIS - MUSE: 50 DEGREES
PR INTERVAL - MUSE: 178 MS
QRS DURATION - MUSE: 96 MS
QT - MUSE: 380 MS
QTC - MUSE: 462 MS
R AXIS - MUSE: 48 DEGREES
SYSTOLIC BLOOD PRESSURE - MUSE: NORMAL MMHG
T AXIS - MUSE: 26 DEGREES
VENTRICULAR RATE- MUSE: 89 BPM

## 2021-10-02 ENCOUNTER — HEALTH MAINTENANCE LETTER (OUTPATIENT)
Age: 36
End: 2021-10-02

## 2021-11-17 ENCOUNTER — E-VISIT (OUTPATIENT)
Dept: URGENT CARE | Facility: URGENT CARE | Age: 36
End: 2021-11-17
Payer: COMMERCIAL

## 2021-11-17 DIAGNOSIS — Z71.89 ADVICE GIVEN ABOUT 2019-NCOV INFECTION: ICD-10-CM

## 2021-11-17 DIAGNOSIS — U07.1 INFECTION DUE TO 2019 NOVEL CORONAVIRUS: Primary | ICD-10-CM

## 2021-11-17 PROCEDURE — 99421 OL DIG E/M SVC 5-10 MIN: CPT | Performed by: FAMILY MEDICINE

## 2021-11-17 RX ORDER — ALBUTEROL SULFATE 90 UG/1
2 AEROSOL, METERED RESPIRATORY (INHALATION) EVERY 4 HOURS PRN
Qty: 18 G | Refills: 0 | Status: SHIPPED | OUTPATIENT
Start: 2021-11-17 | End: 2023-02-09

## 2021-11-17 RX ORDER — PREDNISONE 20 MG/1
20 TABLET ORAL DAILY
Qty: 5 TABLET | Refills: 0 | Status: SHIPPED | OUTPATIENT
Start: 2021-11-17 | End: 2021-11-22

## 2021-11-17 NOTE — PATIENT INSTRUCTIONS
Vish Collins,  I tried to call you through your cellphone and was unable to reach you.  Based on your symptoms I feel being seen in person in urgent care or ER (if symptoms severe) is the best course of action.  However you had clearly indicated on evisit that you wanted to try an inhaler first.  I sent a prescription for an inhaler.  Occasionally steroids may help with covid as well. Steroids such as prednisone.  Prescription sent for this as well.    Possible side effects below.  Medicine: Albuterol (al-BYOO-ter-ahl)  What it does  This medicine works quickly to relax muscles around your airways and make breathing easier. The medicine usually lasts 3 to 4 hours. Use it when you need fast relief.  Test spray (priming)  To prime the inhaler, shake it and spray 3 times, away from your face.  Prime the inhaler:    Before the first use,    If you haven't used it for 2 weeks, OR    If you have dropped it.  How to use this inhaler  1. Wash and dry your hands well.  2. Remove the mouthpiece cover. Check for loose parts inside the mouthpiece.  3. Sit up straight or stand up.  4. Shake the inhaler well before each spray.  5. Hold the inhaler so the mouthpiece is at the bottom and the canister is sticking straight up. Fully exhale (breathe out) through your mouth.  6. Place the mouthpiece between your lips. Make sure that your tongue is flat under the mouthpiece and does not block the opening.  7. Seal your lips around the mouthpiece.  8. Push the canister all the way down as you slowly take a deep breath through your mouth for 5 seconds.  9. Hold your breath for 10 seconds. If you can't hold your breath for 10 seconds, hold it for as long as you can.  10. If you need another dose, wait 1 minute and repeat steps 4 to 9.  11. Replace the cover after each use. Store in a cool, dry place.  Cleaning  Clean the mouthpiece every week. The inhaler may not work as well if you don't clean it.  1. Remove the canister. Don't get it  wet.  2. Wash the mouthpiece with warm water and let air-dry overnight.  How to tell if the inhaler is empty  Each inhaler contains 200 puffs. This inhaler does not have a counter. Keep track of your doses each time you use the inhaler.  If you have questions about the use of your inhaler, please ask your pharmacist or provider.  For informational purposes only. Not to replace the advice of your health care provider. Copyright   2019 [facility]. All rights reserved. Clinically reviewed by Imani Balderas, PharmD, BCACP. [employer] [id number] - 11/19.     Prednisone Oral Tablet 20 mg  Uses  This medicine is used for the following purposes:    allergic reaction    autoimmune disorder    blood disorder    endocrine disorder    inflammatory disease    immune suppression    cancer    COVID-19 (coronavirus)  Instructions  Take the medicine with food.  You may take with food to prevent stomach upset.  Store at room temperature in a dry place. Do not keep in the bathroom.  Keep the medicine away from heat and light.  It is important that you keep taking each dose of this medicine on time even if you are feeling well.  If you forget to take a dose on time, take it as soon as you remember. If it is almost time for the next dose, do not take the missed dose. Return to your normal dosing schedule. Do not take 2 doses of this medicine at one time.  Please tell your doctor and pharmacist about all the medicines you take. Include both prescription and over-the-counter medicines. Also tell them about any vitamins, herbal medicines, or anything else you take for your health.  If your symptoms do not improve or they worsen while on this medicine, contact your doctor.  Do not suddenly stop taking this medicine. Check with your doctor before stopping.  This medicine may affect your blood sugar levels. If you have diabetes, talk to your doctor before changing the dose of your diabetes medicine.  This medicine may affect  the strength of your bones. If you have or are at increased risk for developing osteoporosis (weakening of the bones), your doctor may recommend adding foods containing calcium and vitamin D while on this medicine. Please talk to your doctor for more information.  It is very important that you keep all appointments for medical exams and tests while on this medicine.  Cautions  Tell your doctor and pharmacist if you ever had an allergic reaction to a medicine. Symptoms of an allergic reaction can include trouble breathing, skin rash, itching, swelling, or severe dizziness.  This medicine may cause serious bleeding from the stomach or bowels. Stop this medicine and call your doctor immediately if you see any signs of bleeding. Bleeding can cause pain in the stomach, vomiting up liquid that looks like coffee grounds, and red or dark tarry stools.  Do not use the medication any more than instructed.  Speak with your doctor before taking any medicine with aspirin.  Please check with your doctor before drinking alcohol while on this medicine.  This medicine may reduce your body's ability to fight infections. Try to avoid contact with people with colds, flu or other infections.  Speak with your health care provider before receiving any vaccinations.  Tell the doctor or pharmacist if you are pregnant, planning to be pregnant, or breastfeeding.  Ask your pharmacist if this medicine can interact with any of your other medicines. Be sure to tell them about all the medicines you take.  Please tell all your doctors and dentists that you are on this medicine before they provide care.  Do not start or stop any other medicines without first speaking to your doctor or pharmacist.  Do not share this medicine with anyone who has not been prescribed this medicine.  Side Effects  The following is a list of some common side effects from this medicine. Please speak with your doctor about what you should do if you experience these or other  side effects.    agitated feeling or trouble sleeping    increased appetite    increased risk of bleeding    high blood sugar    high blood pressure    increased risk for an infection    nausea    stomach upset or abdominal pain    vomiting  Call your doctor or get medical help right away if you notice any of these more serious side effects:    bone pain    unusual bruising or discoloration on skin    changes in memory, mood, or thinking    depression or feeling sad    swelling of the legs, feet, and hands    menstruation changes (missed or fewer periods)    mood changes    thinning of the skin    unusual or unexplained tiredness or weakness    blurring or changes of vision    sudden or unexplained weight gain  A few people may have an allergic reactions to this medicine. Symptoms can include difficulty breathing, skin rash, itching, swelling, or severe dizziness. If you notice any of these symptoms, seek medical help quickly.

## 2022-02-18 ENCOUNTER — OFFICE VISIT (OUTPATIENT)
Dept: CARDIOLOGY | Facility: CLINIC | Age: 37
End: 2022-02-18
Attending: INTERNAL MEDICINE
Payer: COMMERCIAL

## 2022-02-18 VITALS
SYSTOLIC BLOOD PRESSURE: 123 MMHG | DIASTOLIC BLOOD PRESSURE: 81 MMHG | WEIGHT: 229.6 LBS | OXYGEN SATURATION: 99 % | BODY MASS INDEX: 32.94 KG/M2 | HEART RATE: 79 BPM

## 2022-02-18 DIAGNOSIS — I49.3 PVC'S (PREMATURE VENTRICULAR CONTRACTIONS): Primary | ICD-10-CM

## 2022-02-18 PROCEDURE — 93005 ELECTROCARDIOGRAM TRACING: CPT

## 2022-02-18 PROCEDURE — 99213 OFFICE O/P EST LOW 20 MIN: CPT | Performed by: INTERNAL MEDICINE

## 2022-02-18 PROCEDURE — G0463 HOSPITAL OUTPT CLINIC VISIT: HCPCS | Mod: 25

## 2022-02-18 ASSESSMENT — PAIN SCALES - GENERAL: PAINLEVEL: NO PAIN (0)

## 2022-02-18 NOTE — LETTER
2/18/2022      RE: Karina Tubbs  [address] [facility] [address]       Dear Colleague,    Thank you for the opportunity to participate in the care of your patient, Karina Tubbs, at the [facility] HEART CLINIC [facility]. Please see a copy of my visit note below.    HPI:  Mrs. Tubbs is a 37 yo female with a PMH of PVCs and VT.  She was referred to consider whether she might be a candidate for catheter ablation of PVCs and VTs.  She has been healthy until early 2021 when she visited [place].  At that time, she developed sunburn and visited a walk-in clinic where she was found to PVCs and VTs. She was also found to have low potassium. Even after potassium level was corrected, she continued to have PVCs and VTs. In [place] she was initially started on amiodarone drip but this was ultimately terminated and she was sent out with metoprolol 25 mg twice daily.  Since then it has been increased to 50 mg twice daily and appears to be controlling her symptoms quite nicely.  However, she could not tolerate such a big dose of Metoprolol.  She complained of palpitation, bur denied any chest pain, SOB or dizziness.   She then underwent catheter ablation of PVC and VT exiting from the anterior and posterior aspects of the septal RVOT on 4/27/2021.  Since then she has been doing well, and is now seen for a follow up.    PAST MEDICAL HISTORY:  Past Medical History:   Diagnosis Date     VT (ventricular tachycardia) (H)        CURRENT MEDICATIONS:  Current Outpatient Medications   Medication Sig Dispense Refill     albuterol (PROAIR HFA/PROVENTIL HFA/VENTOLIN HFA) 108 (90 Base) MCG/ACT inhaler Inhale 2 puffs into the lungs every 4 hours as needed for shortness of breath / dyspnea or wheezing May substitute the equivalent medication per insurance preference. 18 g 0     Multiple Vitamins-Minerals (MULTIVITAMIN WOMEN PO) Take 1 tablet by mouth daily          PAST  SURGICAL HISTORY:  Past Surgical History:   Procedure Laterality Date     EP ABLATION VT/PVC N/A 4/27/2021    Procedure: EP ABLATION VT;  Surgeon: Papa Alas MD;  Location:  HEART CARDIAC CATH LAB     wisdom teeth removal         ALLERGIES:     Allergies   Allergen Reactions     Sulfa Drugs Rash     Allergic since childhood         FAMILY HISTORY:  - Premature coronary artery disease  - Atrial fibrillation  - Sudden cardiac death     SOCIAL HISTORY:  Social History     Tobacco Use     Smoking status: Never Smoker     Smokeless tobacco: Never Used   Substance Use Topics     Alcohol use: None     Comment: rarely     Drug use: None       ROS:   12 points of ROS were reviewed.  Constitutional: No fever, chills, or sweats. Weight stable.   ENT: No visual disturbance, ear ache, epistaxis, sore throat.   Cardiovascular: As per HPI.   Respiratory: No cough, hemoptysis.    GI: No nausea, vomiting, hematemesis, melena, or hematochezia.   : No hematuria.   Integument: Negative.   Psychiatric: Negative.   Hematologic:  Easy bruising, no easy bleeding.  Neuro: Negative.   Endocrinology: No significant heat or cold intolerance   Musculoskeletal: No myalgia.    Exam:  GENERAL: Healthy, alert and no distress  EYES: Eyes grossly normal to inspection.  No discharge or erythema, or obvious scleral/conjunctival abnormalities.  RESP: No audible wheeze, cough, or visible cyanosis.  No visible retractions or increased work of breathing.    SKIN: Visible skin clear. No significant rash, abnormal pigmentation or lesions.  NEURO: Cranial nerves grossly intact.  Mentation and speech appropriate for age.  PSYCH: Mentation appears normal, affect normal/bright, judgement and insight intact, normal speech and appearance well-groomed.    Labs:  CBC RESULTS:   Lab Results   Component Value Date    WBC 7.5 04/27/2021    RBC 4.40 04/27/2021    HGB 13.7 04/27/2021    HCT 38.7 04/27/2021    MCV 88 04/27/2021    MCH 31.1 04/27/2021    MCHC 35.4  04/27/2021    RDW 12.4 04/27/2021     04/27/2021       BMP RESULTS:  Lab Results   Component Value Date     04/27/2021    POTASSIUM 4.1 04/27/2021    CHLORIDE 108 04/27/2021    CO2 25 04/27/2021    ANIONGAP 6 04/27/2021    GLC 93 04/27/2021    BUN 10 04/27/2021    CR 0.67 04/27/2021    GFRESTIMATED >90 04/27/2021    GFRESTBLACK >90 04/27/2021    GABRIELA 9.0 04/27/2021        INR RESULTS:  No results found for: INR    Procedures:  Biotel monitor in 3/2021: Reviewed.            Strss TTE on 3/2/2021: Reviewed.  WNL.    ECG on 8/6/2021: Reviewed.  Sinus rhythm with occasional PVCs originating from the OT.      Zio patch in 7/2021: Reviewed.      ECG on 2/18/2022: Reviewed.  WNL.    Assessment and Plan:  # PVCs and NSVTs  PVC burden = 2% per Biotel monitor.  # s/p catheter ablation of PVC and VT exiting from the anterior and posterior aspects of the septal RVOT on 4/27/2021.  The ablation appears to be successful for VT although her PVC burden remained the same as before the ablation.  The remaining PVCs are likely to originate from the RVOT.  I advised her to continue monitoring her PVC symptoms and call us if she starts feeling more PVCs.  No regular follow up will be required at this point.    I spent a total of 20 min today to review the records, see the patient, and complete the documents.    CC  Patient Care Team:  No Ref-Primary, Physician as PCP - General  David Berry MD as MD (Clinical Cardiac Electrophysiology)  Beth Estevez, RN as Specialty Care Coordinator (Cardiology)  Papa Alas MD (Cardiovascular Disease)  Papa Alas MD as Assigned Heart and Vascular Provider  STORM TSAI MD

## 2022-02-18 NOTE — PATIENT INSTRUCTIONS
Plan:     Follow-up as needed      Your Care Team:  EP Cardiology   Telephone Number     Beth Estevez RN (A-K)  Nisa Garcia RN (L-Z) [phone]     For scheduling appts or procedures:    Gudelia Macdonald   [phone]   For the Device Clinic (Pacemakers, ICDs, Loop Recorders)    During business hours: [phone]  After business hours:   [phone]- select option 4 and ask for job code [id number].       Cardiovascular Clinic:   [address] [place] [address]. [place] [address]      As always, Thank you for trusting us with your health care needs!

## 2022-02-18 NOTE — NURSING NOTE
Chief Complaint   Patient presents with     Follow Up     6 mo follow-up PVCs, VT. Hx PVC/VT ablation 4/27/21     Vitals were taken, medications reconciled, and EKG was performed.    ARY Cobb  4:07 PM

## 2022-02-18 NOTE — PROGRESS NOTES
HPI:  Mrs. Tubbs is a 37 yo female with a PMH of PVCs and VT.  She was referred to consider whether she might be a candidate for catheter ablation of PVCs and VTs.  She has been healthy until early 2021 when she visited [place].  At that time, she developed sunburn and visited a walk-in clinic where she was found to PVCs and VTs. She was also found to have low potassium. Even after potassium level was corrected, she continued to have PVCs and VTs. In [place] she was initially started on amiodarone drip but this was ultimately terminated and she was sent out with metoprolol 25 mg twice daily.  Since then it has been increased to 50 mg twice daily and appears to be controlling her symptoms quite nicely.  However, she could not tolerate such a big dose of Metoprolol.  She complained of palpitation, bur denied any chest pain, SOB or dizziness.   She then underwent catheter ablation of PVC and VT exiting from the anterior and posterior aspects of the septal RVOT on 4/27/2021.  Since then she has been doing well, and is now seen for a follow up.    PAST MEDICAL HISTORY:  Past Medical History:   Diagnosis Date     VT (ventricular tachycardia) (H)        CURRENT MEDICATIONS:  Current Outpatient Medications   Medication Sig Dispense Refill     albuterol (PROAIR HFA/PROVENTIL HFA/VENTOLIN HFA) 108 (90 Base) MCG/ACT inhaler Inhale 2 puffs into the lungs every 4 hours as needed for shortness of breath / dyspnea or wheezing May substitute the equivalent medication per insurance preference. 18 g 0     Multiple Vitamins-Minerals (MULTIVITAMIN WOMEN PO) Take 1 tablet by mouth daily          PAST SURGICAL HISTORY:  Past Surgical History:   Procedure Laterality Date     EP ABLATION VT/PVC N/A 4/27/2021    Procedure: EP ABLATION VT;  Surgeon: Papa Alas MD;  Location:  [facility]     wisdom teeth removal         ALLERGIES:     Allergies   Allergen Reactions     Sulfa Drugs Rash     Allergic since childhood          FAMILY HISTORY:  - Premature coronary artery disease  - Atrial fibrillation  - Sudden cardiac death     SOCIAL HISTORY:  Social History     Tobacco Use     Smoking status: Never Smoker     Smokeless tobacco: Never Used   Substance Use Topics     Alcohol use: None     Comment: rarely     Drug use: None       ROS:   12 points of ROS were reviewed.  Constitutional: No fever, chills, or sweats. Weight stable.   ENT: No visual disturbance, ear ache, epistaxis, sore throat.   Cardiovascular: As per HPI.   Respiratory: No cough, hemoptysis.    GI: No nausea, vomiting, hematemesis, melena, or hematochezia.   : No hematuria.   Integument: Negative.   Psychiatric: Negative.   Hematologic:  Easy bruising, no easy bleeding.  Neuro: Negative.   Endocrinology: No significant heat or cold intolerance   Musculoskeletal: No myalgia.    Exam:  GENERAL: Healthy, alert and no distress  EYES: Eyes grossly normal to inspection.  No discharge or erythema, or obvious scleral/conjunctival abnormalities.  RESP: No audible wheeze, cough, or visible cyanosis.  No visible retractions or increased work of breathing.    SKIN: Visible skin clear. No significant rash, abnormal pigmentation or lesions.  NEURO: Cranial nerves grossly intact.  Mentation and speech appropriate for age.  PSYCH: Mentation appears normal, affect normal/bright, judgement and insight intact, normal speech and appearance well-groomed.    Labs:  CBC RESULTS:   Lab Results   Component Value Date    WBC 7.5 04/27/2021    RBC 4.40 04/27/2021    HGB 13.7 04/27/2021    HCT 38.7 04/27/2021    MCV 88 04/27/2021    MCH 31.1 04/27/2021    MCHC 35.4 04/27/2021    RDW 12.4 04/27/2021     04/27/2021       BMP RESULTS:  Lab Results   Component Value Date     04/27/2021    POTASSIUM 4.1 04/27/2021    CHLORIDE 108 04/27/2021    CO2 25 04/27/2021    ANIONGAP 6 04/27/2021    GLC 93 04/27/2021    BUN 10 04/27/2021    CR 0.67 04/27/2021    GFRESTIMATED >90 04/27/2021     GFRESTBLACK >90 04/27/2021    GABRIELA 9.0 04/27/2021        INR RESULTS:  No results found for: INR    Procedures:  Biotel monitor in 3/2021: Reviewed.            Strss TTE on 3/2/2021: Reviewed.  WNL.    ECG on 8/6/2021: Reviewed.  Sinus rhythm with occasional PVCs originating from the OT.      Zio patch in 7/2021: Reviewed.      ECG on 2/18/2022: Reviewed.  WNL.    Assessment and Plan:  # PVCs and NSVTs  PVC burden = 2% per Biotel monitor.  # s/p catheter ablation of PVC and VT exiting from the anterior and posterior aspects of the septal RVOT on 4/27/2021.  The ablation appears to be successful for VT although her PVC burden remained the same as before the ablation.  The remaining PVCs are likely to originate from the RVOT.  I advised her to continue monitoring her PVC symptoms and call us if she starts feeling more PVCs.  No regular follow up will be required at this point.    I spent a total of 20 min today to review the records, see the patient, and complete the documents.    CC  Patient Care Team:  No Ref-Primary, Physician as PCP - General  David Berry MD as MD (Clinical Cardiac Electrophysiology)  Beth Estevez, RN as Specialty Care Coordinator (Cardiology)  Papa Alas MD (Cardiovascular Disease)  Papa Alas MD as Assigned Heart and Vascular Provider  STORM TSAI

## 2022-02-19 LAB
ATRIAL RATE - MUSE: 78 BPM
DIASTOLIC BLOOD PRESSURE - MUSE: NORMAL MMHG
INTERPRETATION ECG - MUSE: NORMAL
P AXIS - MUSE: 27 DEGREES
PR INTERVAL - MUSE: 184 MS
QRS DURATION - MUSE: 104 MS
QT - MUSE: 404 MS
QTC - MUSE: 460 MS
R AXIS - MUSE: 12 DEGREES
SYSTOLIC BLOOD PRESSURE - MUSE: NORMAL MMHG
T AXIS - MUSE: -2 DEGREES
VENTRICULAR RATE- MUSE: 78 BPM

## 2022-05-14 ENCOUNTER — HEALTH MAINTENANCE LETTER (OUTPATIENT)
Age: 37
End: 2022-05-14

## 2022-09-03 ENCOUNTER — HEALTH MAINTENANCE LETTER (OUTPATIENT)
Age: 37
End: 2022-09-03

## 2022-11-01 ENCOUNTER — TELEPHONE (OUTPATIENT)
Dept: FAMILY MEDICINE | Facility: CLINIC | Age: 37
End: 2022-11-01

## 2022-11-01 NOTE — TELEPHONE ENCOUNTER
Reason for Call:  Other appointment and call back    Detailed comments: Pt requesting earlier appointment. Anytime and any with Moe. Pt is schedule 12/5/22    Phone Number Patient can be reached at: Cell number on file:    Telephone Information:   Mobile [phone]       Best Time: anytime    Can we leave a detailed message on this number? YES    Call taken on 11/1/2022 at 4:30 PM by Lele Garcia

## 2022-11-02 NOTE — TELEPHONE ENCOUNTER
Unfortunately, no earlier appointments are available.  May schedule with alternate provider if desires.    Bel BAKERC

## 2022-12-05 ENCOUNTER — OFFICE VISIT (OUTPATIENT)
Dept: FAMILY MEDICINE | Facility: CLINIC | Age: 37
End: 2022-12-05
Payer: COMMERCIAL

## 2022-12-05 VITALS
DIASTOLIC BLOOD PRESSURE: 74 MMHG | HEIGHT: 70 IN | OXYGEN SATURATION: 98 % | WEIGHT: 231 LBS | RESPIRATION RATE: 15 BRPM | BODY MASS INDEX: 33.07 KG/M2 | SYSTOLIC BLOOD PRESSURE: 126 MMHG | TEMPERATURE: 97.6 F | HEART RATE: 88 BPM

## 2022-12-05 DIAGNOSIS — E04.1 THYROID NODULE: ICD-10-CM

## 2022-12-05 DIAGNOSIS — Z11.59 NEED FOR HEPATITIS C SCREENING TEST: ICD-10-CM

## 2022-12-05 DIAGNOSIS — I47.10 PAROXYSMAL SUPRAVENTRICULAR TACHYCARDIA (H): ICD-10-CM

## 2022-12-05 DIAGNOSIS — K21.00 GASTROESOPHAGEAL REFLUX DISEASE WITH ESOPHAGITIS WITHOUT HEMORRHAGE: Primary | ICD-10-CM

## 2022-12-05 DIAGNOSIS — Z13.220 SCREENING FOR LIPOID DISORDERS: ICD-10-CM

## 2022-12-05 PROBLEM — I47.29 PAROXYSMAL VENTRICULAR TACHYCARDIA (H): Status: RESOLVED | Noted: 2021-04-14 | Resolved: 2022-12-05

## 2022-12-05 PROBLEM — I47.20 PAROXYSMAL VENTRICULAR TACHYCARDIA (H): Status: RESOLVED | Noted: 2021-04-14 | Resolved: 2022-12-05

## 2022-12-05 LAB
ANION GAP SERPL CALCULATED.3IONS-SCNC: 5 MMOL/L (ref 3–14)
BUN SERPL-MCNC: 10 MG/DL (ref 7–30)
CALCIUM SERPL-MCNC: 9 MG/DL (ref 8.5–10.1)
CHLORIDE BLD-SCNC: 107 MMOL/L (ref 94–109)
CHOLEST SERPL-MCNC: 181 MG/DL
CO2 SERPL-SCNC: 27 MMOL/L (ref 20–32)
CREAT SERPL-MCNC: 0.78 MG/DL (ref 0.52–1.04)
DEPRECATED CALCIDIOL+CALCIFEROL SERPL-MC: 24 UG/L (ref 20–75)
FASTING STATUS PATIENT QL REPORTED: YES
GFR SERPL CREATININE-BSD FRML MDRD: >90 ML/MIN/1.73M2
GLUCOSE BLD-MCNC: 101 MG/DL (ref 70–99)
HDLC SERPL-MCNC: 34 MG/DL
LDLC SERPL CALC-MCNC: 92 MG/DL
NONHDLC SERPL-MCNC: 147 MG/DL
POTASSIUM BLD-SCNC: 4 MMOL/L (ref 3.4–5.3)
SODIUM SERPL-SCNC: 139 MMOL/L (ref 133–144)
TRIGL SERPL-MCNC: 276 MG/DL
TSH SERPL DL<=0.005 MIU/L-ACNC: 2.06 MU/L (ref 0.4–4)

## 2022-12-05 PROCEDURE — 80061 LIPID PANEL: CPT | Performed by: NURSE PRACTITIONER

## 2022-12-05 PROCEDURE — 36415 COLL VENOUS BLD VENIPUNCTURE: CPT | Performed by: NURSE PRACTITIONER

## 2022-12-05 PROCEDURE — 84443 ASSAY THYROID STIM HORMONE: CPT | Performed by: NURSE PRACTITIONER

## 2022-12-05 PROCEDURE — 99204 OFFICE O/P NEW MOD 45 MIN: CPT | Performed by: NURSE PRACTITIONER

## 2022-12-05 PROCEDURE — 82306 VITAMIN D 25 HYDROXY: CPT | Performed by: NURSE PRACTITIONER

## 2022-12-05 PROCEDURE — 80048 BASIC METABOLIC PNL TOTAL CA: CPT | Performed by: NURSE PRACTITIONER

## 2022-12-05 RX ORDER — FAMOTIDINE 20 MG/1
20 TABLET, FILM COATED ORAL 2 TIMES DAILY
Qty: 60 TABLET | Refills: 0 | Status: SHIPPED | OUTPATIENT
Start: 2022-12-05 | End: 2023-02-09

## 2022-12-05 ASSESSMENT — ENCOUNTER SYMPTOMS
SINUS PRESSURE: 0
COUGH: 0
HEARTBURN: 1
DIAPHORESIS: 0
FATIGUE: 0
DIARRHEA: 0
HEADACHES: 0
SHORTNESS OF BREATH: 0
CHILLS: 0
CONSTIPATION: 0
EYE ITCHING: 0
FEVER: 0
NAUSEA: 0
SORE THROAT: 0
EYE DISCHARGE: 0
WHEEZING: 0
LIGHT-HEADEDNESS: 0
DIZZINESS: 0
TROUBLE SWALLOWING: 1
RHINORRHEA: 0

## 2022-12-05 ASSESSMENT — PAIN SCALES - GENERAL: PAINLEVEL: NO PAIN (0)

## 2022-12-05 NOTE — PATIENT INSTRUCTIONS
Start taking Pepcid/famotidine twice per day for the next month.  If your heartburn/reflux symptoms resolved you may decrease the Pepcid/famotidine to daily.  If the sensation of food getting stuck does not improve over the next month please send me a [employer] message and we will get you set up for an upper endoscopy.

## 2022-12-05 NOTE — PROGRESS NOTES
"  Assessment & Plan     Need for hepatitis C screening test  - Hepatitis C Screen Reflex to HCV RNA Quant and Genotype; Future    Gastroesophageal reflux disease with esophagitis without hemorrhage  Education given.  Recommend Pepcid/famotidine twice daily x1 month, if symptoms resolve may decrease to daily.  If symptoms persist would recommend upper endoscopy.  - famotidine (PEPCID) 20 MG tablet; Take 1 tablet (20 mg) by mouth 2 times daily    Thyroid nodule  - US Thyroid; Future  - TSH with free T4 reflex; Future  - TSH with free T4 reflex    Screening for lipoid disorders  - Lipid panel reflex to direct LDL Fasting; Future  - Lipid panel reflex to direct LDL Fasting    Paroxysmal supraventricular tachycardia (H)  Currently without symptoms.  Doing well post ablation.    Declines referral to comprehensive weight management.    We will schedule for future office visit to return for physical with Pap.    Review of the result(s) of each unique test - labs, ultrasound   Ordering of each unique test  Prescription drug management  33 minutes spent on the date of the encounter doing chart review, history and exam, documentation and further activities per the note       BMI:   Estimated body mass index is 33.15 kg/m  as calculated from the following:    Height as of this encounter: 1.778 m (5' 10\").    Weight as of this encounter: 104.8 kg (231 lb).       Return in about 4 weeks (around 1/2/2023), or if symptoms worsen or fail to improve.    CARIN Lyons [facility] [place] Collins is a 37 year old presenting for the following health issues:  Dysphagia      History of Present Illness       Reason for visit:  Swallowing, thyroid, lab testing    She eats 2-3 servings of fruits and vegetables daily.She exercises with enough effort to increase her heart rate 60 or more minutes per day.  She exercises with enough effort to increase her heart rate 5 days per week.   She is taking " "medications regularly.     *Dysphagia/food getting stuck in throat x6 months-concerns of thyroid or inflammation in the esophagus due to acid reflux. Would like to have both checked.  Family hx of thyroid problems.    Mariel Shaw CMA      Is fasting today for lab    Feels like things are getting stuck in throat. Happens when eats bread, rice-things that expand. Does get some heart burn and refulx. Thinks occurs 3-4 times per week. No home over the counter medications. Does drink carbonated beverages, no alcholol, no spicy foods. Uncertain what triggers it. Will occur more when tries to lay down. Does try sleep with more pillows to elevated head of bed. Can feel heart beating in left neck at times when lays down.  Had heart ablation in 2021. Felt like had popcorn in chest at that time.  Has not had any recent symptoms.    Difficulty losing weight.  Has been working with a .  Induced menopause.  Declines referral to comprehensive weight management.    Review of Systems   Constitutional: Negative for chills, diaphoresis, fatigue and fever.   HENT: Positive for trouble swallowing (food getting stuck). Negative for ear discharge, ear pain, hearing loss, rhinorrhea, sinus pressure and sore throat.    Eyes: Negative for discharge and itching.   Respiratory: Negative for cough, shortness of breath and wheezing.    Gastrointestinal: Positive for heartburn. Negative for constipation, diarrhea and nausea.   Skin: Negative for rash.   Neurological: Negative for dizziness, light-headedness and headaches.          Objective    /74   Pulse 88   Temp 97.6  F (36.4  C) (Tympanic)   Resp 15   Ht 1.778 m (5' 10\")   Wt 104.8 kg (231 lb)   SpO2 98%   BMI 33.15 kg/m    Body mass index is 33.15 kg/m .  Physical Exam  Constitutional:       Appearance: She is well-developed and well-nourished.   HENT:      Mouth/Throat:      Mouth: Mucous membranes are moist.      Pharynx: Oropharynx is clear.   Cardiovascular: "      Rate and Rhythm: Normal rate and regular rhythm.   Pulmonary:      Effort: Pulmonary effort is normal.      Breath sounds: Normal breath sounds.   Skin:     General: Skin is warm.   Neurological:      Mental Status: She is alert.   Psychiatric:         Mood and Affect: Mood and affect normal.

## 2023-02-09 ENCOUNTER — OFFICE VISIT (OUTPATIENT)
Dept: FAMILY MEDICINE | Facility: CLINIC | Age: 38
End: 2023-02-09
Payer: COMMERCIAL

## 2023-02-09 VITALS
BODY MASS INDEX: 32.5 KG/M2 | SYSTOLIC BLOOD PRESSURE: 124 MMHG | TEMPERATURE: 98 F | OXYGEN SATURATION: 95 % | DIASTOLIC BLOOD PRESSURE: 72 MMHG | RESPIRATION RATE: 15 BRPM | HEART RATE: 93 BPM | HEIGHT: 70 IN | WEIGHT: 227 LBS

## 2023-02-09 DIAGNOSIS — E28.39 PREMATURE OVARIAN FAILURE: ICD-10-CM

## 2023-02-09 DIAGNOSIS — Z00.00 ROUTINE GENERAL MEDICAL EXAMINATION AT A HEALTH CARE FACILITY: ICD-10-CM

## 2023-02-09 DIAGNOSIS — I47.10 PAROXYSMAL SUPRAVENTRICULAR TACHYCARDIA (H): ICD-10-CM

## 2023-02-09 DIAGNOSIS — Z12.4 CERVICAL CANCER SCREENING: ICD-10-CM

## 2023-02-09 DIAGNOSIS — Z11.59 NEED FOR HEPATITIS C SCREENING TEST: ICD-10-CM

## 2023-02-09 PROCEDURE — 99395 PREV VISIT EST AGE 18-39: CPT | Performed by: NURSE PRACTITIONER

## 2023-02-09 PROCEDURE — 87624 HPV HI-RISK TYP POOLED RSLT: CPT | Performed by: NURSE PRACTITIONER

## 2023-02-09 PROCEDURE — G0145 SCR C/V CYTO,THINLAYER,RESCR: HCPCS | Performed by: NURSE PRACTITIONER

## 2023-02-09 ASSESSMENT — ENCOUNTER SYMPTOMS
PALPITATIONS: 1
CONSTIPATION: 0
RHINORRHEA: 0
NAUSEA: 0
COUGH: 0
DYSPHORIC MOOD: 0
FATIGUE: 0
HEADACHES: 0
WHEEZING: 0
POLYDIPSIA: 0
DIARRHEA: 0
ABDOMINAL DISTENTION: 0
FREQUENCY: 0
VOMITING: 0
ABDOMINAL PAIN: 0
NERVOUS/ANXIOUS: 0
SLEEP DISTURBANCE: 0
DIFFICULTY URINATING: 0
CHEST TIGHTNESS: 0
SORE THROAT: 0
ACTIVITY CHANGE: 0
LIGHT-HEADEDNESS: 0
SHORTNESS OF BREATH: 0
DIZZINESS: 0
POLYPHAGIA: 0
NUMBNESS: 0
ARTHRALGIAS: 0

## 2023-02-09 NOTE — PROGRESS NOTES
SUBJECTIVE:   CC: Karina is an 37 year old who presents for preventive health visit.   Patient has been advised of split billing requirements and indicates understanding: Yes  History of Present Illness       Reason for visit:  Preventative care    She eats 4 or more servings of fruits and vegetables daily.She consumes 0 sweetened beverage(s) daily.She exercises with enough effort to increase her heart rate 60 or more minutes per day.  She exercises with enough effort to increase her heart rate 5 days per week.   She is taking medications regularly.  She is not taking prescribed medications regularly due to Not applicable.  Healthy Habits:     Getting at least 3 servings of Calcium per day:  Yes    Bi-annual eye exam:  NO    Dental care twice a year:  Yes    Sleep apnea or symptoms of sleep apnea:  None    Diet:  Carbohydrate counting    Frequency of exercise:  4-5 days/week    Duration of exercise:  Greater than 60 minutes    Taking medications regularly:  0    Barriers to taking medications:  Not applicable    Medication side effects:  Not applicable    PHQ-2 Total Score: 0    Additional concerns today:  No      Today's PHQ-2 Score:   PHQ-2 ( 1999 [employer]) 2/9/2023   Q1: Little interest or pleasure in doing things 0   Q2: Feeling down, depressed or hopeless 0   PHQ-2 Score 0   PHQ-2 Total Score (12-17 Years)- Positive if 3 or more points; Administer PHQ-A if positive -   Q1: Little interest or pleasure in doing things Not at all   Q2: Feeling down, depressed or hopeless Not at all   PHQ-2 Score 0       Have you ever done Advance Care Planning? (For example, a Health Directive, POLST, or a discussion with a medical provider or your loved ones about your wishes):     Social History     Tobacco Use     Smoking status: Never     Smokeless tobacco: Never   Substance Use Topics     Alcohol use: Not on file     Comment: rarely     If you drink alcohol do you typically have >3 drinks per day or >7 drinks per week?  No    Alcohol Use 2/9/2023   Prescreen: >3 drinks/day or >7 drinks/week? No   No flowsheet data found.    Reviewed orders with patient.  Reviewed health maintenance and updated orders accordingly - Yes  BP Readings from Last 3 Encounters:   02/09/23 124/72   12/05/22 126/74   02/18/22 123/81    Wt Readings from Last 3 Encounters:   02/09/23 103 kg (227 lb)   12/05/22 104.8 kg (231 lb)   02/18/22 104.1 kg (229 lb 9.6 oz)         Breast Cancer Screening:    FHS-7: No flowsheet data found.    Patient under 40 years of age: Routine Mammogram Screening not recommended.   Pertinent mammograms are reviewed under the imaging tab.    History of abnormal Pap smear: NO - age 30-65 PAP every 5 years with negative HPV co-testing recommended     Reviewed and updated as needed this visit by clinical staff   Tobacco  Allergies  Meds  Problems          Mariel Shaw CMA  Reviewed and updated as needed this visit by Provider     Meds  Problems               Here today for physical.  Had labs drawn in December.  Has been trying to do lower carb diet and eat better and throat is better. Is not taking pepcid.  Has lost some weight since last visit.    Premature ovarian failure diagnosed at age 26.  Last normal menstrual cycle in May 2009.  Became pregnant in June 2009.  Breast-feeding for a year.  Did have some vaginal spotting in 2011, full menstrual cycle never did return.  Had testing done in early 2012 which showed elevated FSH, low estradiol.  Did desire pregnancy.  Saw a reproductive endocrinology at the [facility] and unfortunately was told there he were not able to help with fertility.  Was advised to start estrogen replacement-declined due to possibility of side effects.  Is not having any symptoms of menopause.  No vaginal dryness, no pain with intercourse.    History of paroxysmal ventricular tachycardia treated with ablation in April 2021.  Ablation was successful in treating ventricular tachycardia  "however, continues with PVCs which are being watched.  Has requested appointment with cardiology due to increased palpitations.    Last Pap: 11/2017 normal. Never an abnormal.   Last mammogram: never, no family history  Last BMD: N/A  Last Colonoscopy: never, no family   Last eye exam: every 1-2 years   Last dental exam: every 6 mo  Last tetanus vaccine: declines   Last influenza vaccine: Declines  Last shingles vaccine: Not applicable  Last pneumonia vaccine: Not applicable  Last COVID vaccine: Declines  Last COVID booster: Not applicable  Hep C screen: declines   HIV screen: 2009-no new concerns.   AAA screen (age 65-78 with smoking hx): Not applicable  IVD (HTN, Hyperlipid, Smoking): Not applicable  Lung CA screening (50-77, 20 pk smoking hx) Quit within 15 years: Not applicable      Review of Systems   Constitutional: Negative for activity change and fatigue.   HENT: Negative for ear pain, rhinorrhea and sore throat.    Respiratory: Negative for cough, chest tightness, shortness of breath and wheezing.    Cardiovascular: Positive for palpitations. Negative for chest pain.   Gastrointestinal: Negative for abdominal distention, abdominal pain, constipation, diarrhea, nausea and vomiting.   Endocrine: Negative for cold intolerance, heat intolerance, polydipsia, polyphagia and polyuria.   Genitourinary: Negative for difficulty urinating, enuresis, frequency and urgency.   Musculoskeletal: Negative for arthralgias.   Skin: Negative for rash.   Neurological: Negative for dizziness, light-headedness, numbness and headaches.   Psychiatric/Behavioral: Negative for dysphoric mood and sleep disturbance. The patient is not nervous/anxious.         OBJECTIVE:   /72   Pulse 93   Temp 98  F (36.7  C) (Tympanic)   Resp 15   Ht 1.778 m (5' 10\")   Wt 103 kg (227 lb)   SpO2 95%   BMI 32.57 kg/m    Physical Exam  Constitutional:       Appearance: Normal appearance. She is well-developed.   HENT:      Head: " Normocephalic and atraumatic.      Right Ear: Tympanic membrane and external ear normal. No middle ear effusion.      Left Ear: Tympanic membrane and external ear normal.  No middle ear effusion.      Nose: No mucosal edema.      Mouth/Throat:      Pharynx: Uvula midline.   Eyes:      Pupils: Pupils are equal, round, and reactive to light.   Neck:      Thyroid: No thyromegaly.      Vascular: No carotid bruit.   Cardiovascular:      Rate and Rhythm: Normal rate and regular rhythm.      Pulses:           Femoral pulses are 2+ on the right side and 2+ on the left side.       Dorsalis pedis pulses are 2+ on the right side and 2+ on the left side.        Posterior tibial pulses are 2+ on the right side and 2+ on the left side.      Heart sounds: Normal heart sounds.   Pulmonary:      Effort: Pulmonary effort is normal.      Breath sounds: Normal breath sounds.   Chest:   Breasts:     Breasts are symmetrical.      Right: No inverted nipple, mass, nipple discharge, skin change or tenderness.      Left: No inverted nipple, mass, nipple discharge, skin change or tenderness.   Abdominal:      General: Bowel sounds are normal.      Palpations: Abdomen is soft.      Tenderness: There is no abdominal tenderness.   Genitourinary:     Labia:         Right: No lesion.         Left: No lesion.       Vagina: Normal. No vaginal discharge or erythema.      Cervix: No cervical motion tenderness or discharge.      Uterus: Not enlarged.       Adnexa:         Right: No mass or tenderness.          Left: No mass or tenderness.     Musculoskeletal:         General: Normal range of motion.      Cervical back: Normal range of motion.   Skin:     General: Skin is warm and dry.      Findings: No rash.   Neurological:      Mental Status: She is alert.   Psychiatric:         Behavior: Behavior normal.         ASSESSMENT/PLAN:   Routine general medical examination at a health care facility  Screening guidelines reviewed.     Need for hepatitis C  "screening test  Declines screening    Cervical cancer screening  - Pap Screen with HPV - recommended age 30 - 65 years    Paroxysmal supraventricular tachycardia (H)  Most recent cardiology notes reviewed.  Appointment scheduled with cardiology in March.    Premature ovarian failure  Interestingly, vaginal cavity moist.  No symptoms of POF        COUNSELING:  Reviewed preventive health counseling, as reflected in patient instructions       Regular exercise       Healthy diet/nutrition       Vision screening       Immunizations    Declined: Covid-19, Influenza and TDAP due to Conscientious objector               Colorectal Cancer Screening       Consider Hep C screening for all patients one time for ages 18-79 years       HIV screeninx in teen years, 1x in adult years, and at intervals if high risk      BMI:   Estimated body mass index is 32.57 kg/m  as calculated from the following:    Height as of this encounter: 1.778 m (5' 10\").    Weight as of this encounter: 103 kg (227 lb).   Weight management plan: Discussed healthy diet and exercise guidelines      She reports that she has never smoked. She has never used smokeless tobacco.    CARIN Lyons CNP  [facility] [place]  "

## 2023-02-13 LAB
BKR LAB AP GYN ADEQUACY: NORMAL
BKR LAB AP GYN INTERPRETATION: NORMAL
BKR LAB AP HPV REFLEX: NORMAL
BKR LAB AP PREVIOUS ABNORMAL: NORMAL
PATH REPORT.COMMENTS IMP SPEC: NORMAL
PATH REPORT.COMMENTS IMP SPEC: NORMAL
PATH REPORT.RELEVANT HX SPEC: NORMAL

## 2023-02-15 LAB
HUMAN PAPILLOMA VIRUS 16 DNA: NEGATIVE
HUMAN PAPILLOMA VIRUS 18 DNA: NEGATIVE
HUMAN PAPILLOMA VIRUS FINAL DIAGNOSIS: NORMAL
HUMAN PAPILLOMA VIRUS OTHER HR: NEGATIVE

## 2023-02-17 ENCOUNTER — MYC MEDICAL ADVICE (OUTPATIENT)
Dept: FAMILY MEDICINE | Facility: CLINIC | Age: 38
End: 2023-02-17
Payer: COMMERCIAL

## 2023-02-17 ENCOUNTER — MYC MEDICAL ADVICE (OUTPATIENT)
Dept: CARDIOLOGY | Facility: CLINIC | Age: 38
End: 2023-02-17
Payer: COMMERCIAL

## 2023-02-17 NOTE — TELEPHONE ENCOUNTER
Papa Alas MD McDonald, Dana L, RN  Phone Number: [phone]     It could be a venous dilatation or something else.   Please refer her to a PCP.   Thanks.   TY

## 2023-02-17 NOTE — TELEPHONE ENCOUNTER
Patient needs to be evaluated in person for an exam. Doesn't sound urgent, no red flag symptoms. Schedule with Bel in the next 2-3 weeks

## 2023-02-17 NOTE — TELEPHONE ENCOUNTER
Spoke with pt to go over red flag symptoms.     Bel is it okay to use one of your same day slots for this ?    Also pt will be in [place] 03/02-03/12 , hoping to be seen for it before she goes.     Cary Anna RN  [facility]

## 2023-02-20 ENCOUNTER — APPOINTMENT (OUTPATIENT)
Dept: URBAN - METROPOLITAN AREA CLINIC 252 | Age: 38
Setting detail: DERMATOLOGY
End: 2023-02-21

## 2023-02-20 VITALS — HEIGHT: 70 IN | WEIGHT: 220 LBS

## 2023-02-20 DIAGNOSIS — L24 IRRITANT CONTACT DERMATITIS: ICD-10-CM

## 2023-02-20 DIAGNOSIS — L85.3 XEROSIS CUTIS: ICD-10-CM

## 2023-02-20 DIAGNOSIS — Z71.89 OTHER SPECIFIED COUNSELING: ICD-10-CM

## 2023-02-20 DIAGNOSIS — L81.4 OTHER MELANIN HYPERPIGMENTATION: ICD-10-CM

## 2023-02-20 DIAGNOSIS — D22 MELANOCYTIC NEVI: ICD-10-CM

## 2023-02-20 DIAGNOSIS — L57.0 ACTINIC KERATOSIS: ICD-10-CM

## 2023-02-20 PROBLEM — L24.9 IRRITANT CONTACT DERMATITIS, UNSPECIFIED CAUSE: Status: ACTIVE | Noted: 2023-02-20

## 2023-02-20 PROBLEM — D22.5 MELANOCYTIC NEVI OF TRUNK: Status: ACTIVE | Noted: 2023-02-20

## 2023-02-20 PROBLEM — D22.62 MELANOCYTIC NEVI OF LEFT UPPER LIMB, INCLUDING SHOULDER: Status: ACTIVE | Noted: 2023-02-20

## 2023-02-20 PROCEDURE — 17000 DESTRUCT PREMALG LESION: CPT

## 2023-02-20 PROCEDURE — OTHER MIPS QUALITY: OTHER

## 2023-02-20 PROCEDURE — 17003 DESTRUCT PREMALG LES 2-14: CPT

## 2023-02-20 PROCEDURE — 99213 OFFICE O/P EST LOW 20 MIN: CPT | Mod: 25

## 2023-02-20 PROCEDURE — OTHER COUNSELING: OTHER

## 2023-02-20 PROCEDURE — OTHER LIQUID NITROGEN: OTHER

## 2023-02-20 ASSESSMENT — LOCATION SIMPLE DESCRIPTION DERM
LOCATION SIMPLE: UPPER BACK
LOCATION SIMPLE: RIGHT CHEEK
LOCATION SIMPLE: ABDOMEN
LOCATION SIMPLE: RIGHT EYEBROW
LOCATION SIMPLE: CHEST
LOCATION SIMPLE: LEFT SHOULDER
LOCATION SIMPLE: LEFT WRIST
LOCATION SIMPLE: LEFT EAR
LOCATION SIMPLE: RIGHT ANTERIOR NECK
LOCATION SIMPLE: RIGHT SHOULDER

## 2023-02-20 ASSESSMENT — LOCATION DETAILED DESCRIPTION DERM
LOCATION DETAILED: LEFT SUPERIOR HELIX
LOCATION DETAILED: RIGHT CENTRAL MALAR CHEEK
LOCATION DETAILED: SUPERIOR THORACIC SPINE
LOCATION DETAILED: LOWER STERNUM
LOCATION DETAILED: RIGHT ANTERIOR SHOULDER
LOCATION DETAILED: LEFT LATERAL DORSAL WRIST
LOCATION DETAILED: RIGHT INFERIOR CENTRAL MALAR CHEEK
LOCATION DETAILED: RIGHT INFERIOR ANTERIOR NECK
LOCATION DETAILED: RIGHT LATERAL EYEBROW
LOCATION DETAILED: LEFT ANTERIOR SHOULDER
LOCATION DETAILED: PERIUMBILICAL SKIN

## 2023-02-20 ASSESSMENT — LOCATION ZONE DERM
LOCATION ZONE: EAR
LOCATION ZONE: TRUNK
LOCATION ZONE: NECK
LOCATION ZONE: ARM
LOCATION ZONE: FACE

## 2023-02-20 NOTE — PROCEDURE: COUNSELING
Patient Specific Counseling (Will Not Stick From Patient To Patient): - Discussed daily use of Nicotinamide; information sheet given.
Detail Level: Detailed
Detail Level: Zone
Patient Specific Counseling (Will Not Stick From Patient To Patient): - Recommend use of OTC hydrocortisone if becomes itchy or irritated.
Detail Level: Simple
Detail Level: Generalized

## 2023-02-20 NOTE — PROCEDURE: LIQUID NITROGEN
Detail Level: Detailed
Post-Care Instructions: - Patient was instructed to avoid picking at any of the treated lesions.
Consent: - Discussed that these are a result of cumulative sun exposure.\\n- Verbal and written consent was obtained, and risks were reviewed prior to procedure today. \\n- Risks discussed include but are not limited to pain, crusting, scabbing, blistering, scarring, temporary or permanent darker or lighter pigmentary change, recurrence, incomplete resolution, and infection.
Show Applicator Variable?: Yes
Application Tool (Optional): Liquid Nitrogen Sprayer

## 2023-02-21 NOTE — TELEPHONE ENCOUNTER
Please update patient that I was out of clinic-that is the reason for the delayed response.     You can place her in any providers virtual slot. Does not need to be seen in clinic.     Bel BAKERC

## 2023-02-22 ENCOUNTER — ANCILLARY PROCEDURE (OUTPATIENT)
Dept: ULTRASOUND IMAGING | Facility: CLINIC | Age: 38
End: 2023-02-22
Attending: NURSE PRACTITIONER
Payer: COMMERCIAL

## 2023-02-22 ENCOUNTER — TELEPHONE (OUTPATIENT)
Dept: FAMILY MEDICINE | Facility: CLINIC | Age: 38
End: 2023-02-22

## 2023-02-22 ENCOUNTER — VIRTUAL VISIT (OUTPATIENT)
Dept: FAMILY MEDICINE | Facility: CLINIC | Age: 38
End: 2023-02-22
Payer: COMMERCIAL

## 2023-02-22 DIAGNOSIS — R09.89 JUGULAR VENOUS DISTENSION: ICD-10-CM

## 2023-02-22 DIAGNOSIS — I87.9 DISORDER OF VEIN: ICD-10-CM

## 2023-02-22 DIAGNOSIS — I77.1: Primary | ICD-10-CM

## 2023-02-22 DIAGNOSIS — Z71.89 ADVANCED CARE PLANNING/COUNSELING DISCUSSION: Primary | ICD-10-CM

## 2023-02-22 DIAGNOSIS — R93.89 ABNORMAL ULTRASOUND: ICD-10-CM

## 2023-02-22 PROCEDURE — 93971 EXTREMITY STUDY: CPT | Mod: LT | Performed by: RADIOLOGY

## 2023-02-22 PROCEDURE — 99214 OFFICE O/P EST MOD 30 MIN: CPT | Mod: VID | Performed by: NURSE PRACTITIONER

## 2023-02-22 PROCEDURE — 93922 UPR/L XTREMITY ART 2 LEVELS: CPT | Mod: LT | Performed by: RADIOLOGY

## 2023-02-22 NOTE — PROGRESS NOTES
Karina is a 37 year old who is being evaluated via a billable video visit.      How would you like to obtain your AVS? MyChart  If the video visit is dropped, the invitation should be resent by: Text to cell phone: [phone]  Will anyone else be joining your video visit? No      Assessment & Plan     Advanced care planning/counseling discussion      Disorder of vein    - US Head Neck Soft Tissue; Future  - US Carotid Bilateral; Future    Jugular venous distension    - US Head Neck Soft Tissue; Future  - US Carotid Bilateral; Future    30 minutes spent on the date of the encounter doing chart review, history and exam, documentation and further activities per the note     See Patient Instructions: Schedule ultrasound we will let you know results.  If abnormal we will let you know treatment plan.  Follow-up as needed for healthcare questions or concerns.    Return in about 1 week (around 3/1/2023), or if symptoms worsen or fail to improve.    BRIAN IRVIN  [facility] Collins is a 37 year old, presenting for the following health issues:  Consult    Patient concerned in regards to a vein in her neck on the left side that bulges out when lifting her arm and bends her elbow. Questioning if she should have an ultrasound.     Denies pain or symptoms such as chest pain, headaches.  Denies high blood pressure.  Vein and left neck does bulge abnormally large with position.  Feels squishy like a vein.  History of cardiac ablation due to ventricular tachycardia benign.  Worried about aneurysm or other.  No history of blood clots.      Review of Systems   Constitutional, HEENT, cardiovascular, pulmonary, GI, , musculoskeletal, neuro, skin, endocrine and psych systems are negative, except as otherwise noted.      Objective           Vitals:  No vitals were obtained today due to virtual visit.    Physical Exam   GENERAL: Healthy, alert and no distress  EYES: Eyes grossly normal  to inspection.  No discharge or erythema, or obvious scleral/conjunctival abnormalities.  RESP: No audible wheeze, cough, or visible cyanosis.  No visible retractions or increased work of breathing.    SKIN: Visible skin clear. No significant rash, abnormal pigmentation or lesions.  NEURO: Cranial nerves grossly intact.  Mentation and speech appropriate for age.  PSYCH: Mentation appears normal, affect normal/bright, judgement and insight intact, normal speech and appearance well-groomed.    See orders-only neck ultrasounds I see are carotid and soft tissue let me know if different ultrasound order needs to be placed.  Advised patient to call clinic to schedule imaging today or tomorrow.    Video-Visit Details    Type of service:  Video Visit     Originating Location (pt. Location): Other Car  Distant Location (provider location):  Off-site  Platform used for Video Visit: Daniel

## 2023-02-22 NOTE — TELEPHONE ENCOUNTER
Patient calling to get results from ultrasound from today.  Instructed patient provider has not reviewed results yet, and it may take 1-3 days before provider reviews and sends message to patient.    Mal Diggs RN

## 2023-02-22 NOTE — RESULT ENCOUNTER NOTE
Vish Collins,    Thank you for your recent office visit.    Here are your recent results.  Per radiology-Impression:     1. Left:  1a. Venous: Dampened flow in the left subclavian vein with  provocation, or malrotation at 90 degrees and and 35 degrees. No  cessation of flow.  1b. Arterial: Severely dampened arterial waveforms with arm elevation  180 degrees.    I have messaged the radiologist to ask for clarification on this, as I am not 100% sure what this means.  It appears that the blood flow through the left subclavian vein is slowed down with position change of arm, it appears the artery blood flow is severely decreased with arm elevation.  Over a prolonged period of time (for example -if blood flow was stopped for a prolonged period such as sleeping with your arm above your head) I think this could cause issues such as blood clot.  I am awaiting response from the radiologist.  I am putting in a referral for vascular medicine at this time.  Please call to schedule with them at this time for further evaluation of your symptoms-scheduling phone number [phone]    Feel free to contact me via [employer] or call the clinic at [phone].    Sincerely,    Rere Alvarenga, CARIN, FNP-BC

## 2023-02-24 ENCOUNTER — MYC MEDICAL ADVICE (OUTPATIENT)
Dept: FAMILY MEDICINE | Facility: CLINIC | Age: 38
End: 2023-02-24
Payer: COMMERCIAL

## 2023-02-24 ENCOUNTER — TELEPHONE (OUTPATIENT)
Dept: OTHER | Facility: CLINIC | Age: 38
End: 2023-02-24
Payer: COMMERCIAL

## 2023-02-24 NOTE — TELEPHONE ENCOUNTER
Called patient discussed thoracic outlet syndrome.  Advised her that I spoke with another medical doctor and they reports she should be okay to travel if she does not see vascular medicine before as long as she does not have a prolonged bulge such as with her neck turned in a certain position or arm above her head sleeping that would result in a blood clot.  Patient understanding of this discussed need to get in with vascular medicine at the earliest convenience we do not want to wait ongoing but if she had to wait till she came back from her vacation that would be okay unless she started having other symptoms such as swelling in her face or arm skin turning blue tingling or dizziness or other symptoms.  Patient reports she just got a call back that she is getting in with vascular medicine Monday morning.  Advised her to keep that appointment and follow-up if she has any of the above symptoms.  We are here to help as needed if other concerns or questions come up.  Patient thankful for call back and is in agreement to keeping her visit Monday. Rere Alvarenga, CARIN, FNP-BC

## 2023-02-24 NOTE — TELEPHONE ENCOUNTER
Pt referred to [facility] by Rere Richardson NP for extrinsic arterial compression,  disorder of vein, abnormal ultrasound.    TOS imaging in Epic.    Pt needs to be scheduled for consult with Vascular Surgery.    Appt time has been held for patient on 2/27/23 at 10:30am with Dr. Barnett.  If patient is unable to make this time, remove hold and schedule at next available with Dr. Barnett or Dr. Florez.    Appt note: Ref by Rere Richardson NP for extrinsic arterial compression, disorder of vein, abnormal US; TOS imaging in Epic.    Nazanin Morales, YOLIN, RN-[facility] [place]

## 2023-02-24 NOTE — TELEPHONE ENCOUNTER
[facility]    Who is the name of the provider?:  TBCRISTÓBAL    What is the location you see this provider at/preferred location?: [place]  Person calling / Facility: Karina Tubbs  Phone number:  [phone]  Nurse call back needed:  YES     Reason for call:   FYI referral in active requests.  Patients preference is [place], but patient is fine with [place] location. Patient has planned travel soon and hopes to have an understanding of how soon she should be seen. Patient asking for a call back to ease anxiousness about timing of a visit prior to her planned travel.    Pharmacy location:  n/a  Outside Imaging: Not Applicable   Can we leave a detailed message on this number?  YES

## 2023-02-24 NOTE — TELEPHONE ENCOUNTER
Future Appointments   Date Time Provider Department Center   2/27/2023 10:30 AM Beto Barnett MD [facility]

## 2023-02-27 ENCOUNTER — OFFICE VISIT (OUTPATIENT)
Dept: OTHER | Facility: CLINIC | Age: 38
End: 2023-02-27
Attending: SURGERY
Payer: COMMERCIAL

## 2023-02-27 VITALS — SYSTOLIC BLOOD PRESSURE: 123 MMHG | HEART RATE: 108 BPM | DIASTOLIC BLOOD PRESSURE: 78 MMHG

## 2023-02-27 DIAGNOSIS — R93.89 ABNORMAL ULTRASOUND: ICD-10-CM

## 2023-02-27 DIAGNOSIS — I77.1: ICD-10-CM

## 2023-02-27 DIAGNOSIS — I87.9 DISORDER OF VEIN: ICD-10-CM

## 2023-02-27 PROCEDURE — G0463 HOSPITAL OUTPT CLINIC VISIT: HCPCS

## 2023-02-27 PROCEDURE — G0463 HOSPITAL OUTPT CLINIC VISIT: HCPCS | Performed by: SURGERY

## 2023-02-27 PROCEDURE — 99203 OFFICE O/P NEW LOW 30 MIN: CPT | Performed by: SURGERY

## 2023-02-27 NOTE — PROGRESS NOTES
[facility]        Patient is here for a consult.     Pt is currently taking no meds that would impact our treatment plan.    /78 (BP Location: Left arm, Patient Position: Chair, Cuff Size: Adult Regular)   Pulse 108     The provider has been notified that the patient has no concerns.     Questions patient would like addressed today are: N/A.    Refills are needed: N/A    Has homecare services and agency name:  Yadira Cadet MA

## 2023-02-27 NOTE — PROGRESS NOTES
I had the pleasure of seeing Mrs. Karina Tubbs in the vascular surgery clinic today.  She was referred to us for further evaluation of the finding of extrinsic compression of the left axillary and subclavian arteries on recent testing.    Patient tells me that she had noticed a prominent soft lump in the base of the left neck.  This was further evaluated and felt to be a vein and patient then underwent further imaging.  She does not experience any pain, numbness, coolness, paresthesias or discoloration in either upper extremities.    She works in an office in a corporate position.  She is active and participates in regular physical activity and patient has not experienced any pain, numbness or coolness of either upper extremities when performing these exercises.  These exercises do include shoulder workouts with overhead presses of weight.    On examination: She appears comfortable and she is in no acute distress.  Vital signs reviewed.  Upper limb tension test is negative bilaterally.  Extended arm stress test is negative for 1 minute bilaterally.  Tinel's sign is negative bilaterally.  With the arm at 180 degrees there is significant diminishing of the left radial pulse.  None on the right with stress maneuvers.    Imaging data:  Exam: Arterial and venous ultrasound with duplex Doppler assessment of  the left upper extremity dated 2/22/2023 10:52 AM     Clinical information: Please cancel whichever ultrasound is not  needed.  Need to evaluate veins of left neck due to being protrusion  with certain positions; Disorder of vein; Jugular venous distension     Ordering provider: THANG MORENO     Comparison: None     Technique: Grayscale and duplex Doppler assessment of the left upper  extremity central veins supine. Additional grayscale and duplex  Doppler ultrasound of the mid subclavian veins of the left upper  extremity in the neutral position, at 90 degrees, 135 degrees and 180  degrees extension. PPG  waveforms placed on the index fingers of both  hands and obtained with various provocative maneuvers.     Findings:     Right Upper Extremity:     Venous assessment:   Innominate: 67 cm/sec     Left Upper Extremity:     Venous assessment:      Internal jugular: 39 cm/sec  Innominate: 52 cm/sec  Subclavian central: 77 cm/sec  Subclavian mid: 166 cm/sec  Subclavian peripheral 121 cm/sec  Axillary: 87 cm/sec     Provocation (sitting upright erect):     Subclavian mid at 0 degrees: 142 cm/sec  Subclavian mid at 90 degrees: 20 cm/sec  Subclavian mid at 135 degrees: 10 cm/sec  Subclavian mid at 180 degrees: 77 cm/sec        Provocation (sitting upright erect):     Mid internal jugular vein neutral: 158 cm/sec  Mid internal jugular vein head the right: 139 cm/sec  Mid internal jugular vein at the left: 142 cm/sec  Mid internal jugular vein neck extension: 37 cm/sec  Mid internal jugular vein neck flexion: 121 cm/sec        Arterial assessment (index finger PPG):     Baseline: 34  Arms at 90 degrees: >39  Arms at 180 degrees: 3  : 25   head right: 32   head left: 36                                                                      Impression:     1. Left:  1a. Venous: Dampened flow in the left subclavian vein with  provocation, or malrotation at 90 degrees and and 35 degrees. No  cessation of flow.  1b. Arterial: Severely dampened arterial waveforms with arm elevation  180 degrees.     I have personally reviewed the examination and initial interpretation  and I agree with the findings.     TJ DUONG MD     Diagnosis: Left upper extremity extrinsic compression of the left axillary artery at 180 degrees, asymptomatic.    Plan: I explained the thoracic outlet anatomy.  Extrinsic compression of the left axillary artery is asymptomatic and only present at 180 degrees.  This really is of no clinical consequence.  No further treatment necessary.  I have plentifully reassured the patient and her  .

## 2023-10-03 ENCOUNTER — ANCILLARY PROCEDURE (OUTPATIENT)
Dept: GENERAL RADIOLOGY | Facility: CLINIC | Age: 38
End: 2023-10-03
Attending: PEDIATRICS
Payer: COMMERCIAL

## 2023-10-03 ENCOUNTER — OFFICE VISIT (OUTPATIENT)
Dept: ORTHOPEDICS | Facility: CLINIC | Age: 38
End: 2023-10-03
Payer: COMMERCIAL

## 2023-10-03 VITALS — WEIGHT: 227 LBS | HEIGHT: 70 IN | BODY MASS INDEX: 32.5 KG/M2

## 2023-10-03 DIAGNOSIS — M25.561 ACUTE PAIN OF RIGHT KNEE: Primary | ICD-10-CM

## 2023-10-03 DIAGNOSIS — M25.561 ACUTE PAIN OF RIGHT KNEE: ICD-10-CM

## 2023-10-03 DIAGNOSIS — M76.31 ILIOTIBIAL BAND SYNDROME OF RIGHT SIDE: ICD-10-CM

## 2023-10-03 PROCEDURE — 73562 X-RAY EXAM OF KNEE 3: CPT | Mod: TC | Performed by: RADIOLOGY

## 2023-10-03 PROCEDURE — 99203 OFFICE O/P NEW LOW 30 MIN: CPT | Performed by: PEDIATRICS

## 2023-10-03 NOTE — LETTER
10/3/2023         RE: Karina Tubbs  [address]  [place] [address]        Dear Colleague,    Thank you for referring your patient, Karina Tubbs, to the [facility]. Please see a copy of my visit note below.    ASSESSMENT & PLAN    Karina was seen today for pain.    Diagnoses and all orders for this visit:    Acute pain of right knee  -     XR Knee Standing AP [place] Bilat Lat Right; Future    Iliotibial band syndrome of right side        See Patient Instructions  Patient Instructions   Right lateral knee pain most consistent with IT band source.  We discussed there may also be some patellofemoral component for the pain as well.  Primarily functional issues currently, no structural concerns related to the knee joint on exam or on x-ray.  We discussed continued monitoring with time, working on rehab approach as well.  Home exercises reviewed today.  If interested in formal physical therapy, contact clinic, and we can place referral.  Discussed consideration for additional imaging; MRI not required currently.  We could reconsider this, depending on course.  Also discussed consideration of support options for the knee.  May use compression sleeve or wrap if desired, or possibly supportive bracing with activities.  Bracing is not required, simply for comfort with exercise.  Plan to monitor course with home exercises 3 to 4 weeks to begin.  Likely physical therapy next if not improving.    If you have any further questions for your physician or physician s care team you can contact them thru [employer] or by calling [phone].      Christiano Leyva DO  [facility]    -----  Chief Complaint   Patient presents with     Right Knee - Pain       SUBJECTIVE  Karina Tubbs is a/an 38 year old female who is seen as a self referral for evaluation of Right knee.     The patient is seen by themselves.    Onset: several month(s) ago. Reports  "insidious onset without acute precipitating event. Does do weight training 4-days a week. Notes last week she had done leg training and had increased soreness. Notes improvement.  Location of Pain: right knee, reported tenderness with IT band insertion of lateral knee, otherwise diffuse  Worsened by: Weight training with leg (lunges, squats)  Better with:  Treatments tried: no treatment tried to date  Associated symptoms: swelling reported by patient as posterior knee (described as a lump). Notes a history of giving out.    Orthopedic/Surgical history: NO  Social History/Occupation: Corporate       **  Above information per rooming staff.  Additional history:  Pain initially worse, now more intermittent. Can flare with activities.  Notes more with lunging with weights, or with leg curls can get pain.  With initial onset of pain, felt like knee was buckling, but without injury.  Sometimes tender in lateral knee, points near distal IT band.  Occasional right knee cracking this week.      REVIEW OF SYSTEMS:  Review of Systems    OBJECTIVE:  Ht 1.778 m (5' 10\")   Wt 103 kg (227 lb)   BMI 32.57 kg/m     General: healthy, alert and in no distress  Skin: no suspicious lesions or rash.  CV: distal perfusion intact   Resp: normal respiratory effort without conversational dyspnea   Psych: normal mood and affect  Gait: NORMAL  Neuro: Normal light sensory exam of extremity       Right Knee exam    Inspection:   no effusion   no ecchymosis    ROM:      Full active and passive ROM with flexion and extension    Patellar Motion:      Normal patellar tracking noted through range of motion    Tender:      distal IT Band minimal    Non Tender:       remainder of knee area    Special Tests:      neg (-) Richard       neg (-) Lachman       neg (-) anterior drawer       neg (-) posterior drawer       neg (-) varus       neg (-) valgus       no pain with forced extension    Evaluation of ipsilateral kinetic chain:   Some " anterior knee excursion with mini squat, single leg squat, mild pain      RADIOLOGY:  Final results and radiologist's interpretation, available in the [facility] health record.  Images were reviewed with the patient in the office today.  My personal interpretation of the performed imaging: no acute bony abnormality noted. Joint spaces appear preserved.        Recent Results (from the past 24 hour(s))   XR Knee Standing AP [place] Bilat Lat Right    Narrative    XR KNEE STANDING AP [place] BILAT LAT RIGHT 10/3/2023 4:21 PM     HISTORY: Acute pain of right knee    COMPARISON: None.       Impression    IMPRESSION:   Right knee joint spaces are maintained. No fracture or joint  effusion.    Views of the left knee are unremarkable.    JOSE G ROUSSEAU MD         SYSTEM ID:  [id number]                        Again, thank you for allowing me to participate in the care of your patient.        Sincerely,        Christiano Leyva DO

## 2023-10-03 NOTE — PATIENT INSTRUCTIONS
Right lateral knee pain most consistent with IT band source.  We discussed there may also be some patellofemoral component for the pain as well.  Primarily functional issues currently, no structural concerns related to the knee joint on exam or on x-ray.  We discussed continued monitoring with time, working on rehab approach as well.  Home exercises reviewed today.  If interested in formal physical therapy, contact clinic, and we can place referral.  Discussed consideration for additional imaging; MRI not required currently.  We could reconsider this, depending on course.  Also discussed consideration of support options for the knee.  May use compression sleeve or wrap if desired, or possibly supportive bracing with activities.  Bracing is not required, simply for comfort with exercise.  Plan to monitor course with home exercises 3 to 4 weeks to begin.  Likely physical therapy next if not improving.    If you have any further questions for your physician or physician s care team you can contact them thru "[email or web address]t or by calling [phone].

## 2023-10-03 NOTE — PROGRESS NOTES
ASSESSMENT & PLAN    Karina was seen today for pain.    Diagnoses and all orders for this visit:    Acute pain of right knee  -     XR Knee Standing AP [place] Bilat Lat Right; Future    Iliotibial band syndrome of right side        See Patient Instructions  Patient Instructions   Right lateral knee pain most consistent with IT band source.  We discussed there may also be some patellofemoral component for the pain as well.  Primarily functional issues currently, no structural concerns related to the knee joint on exam or on x-ray.  We discussed continued monitoring with time, working on rehab approach as well.  Home exercises reviewed today.  If interested in formal physical therapy, contact clinic, and we can place referral.  Discussed consideration for additional imaging; MRI not required currently.  We could reconsider this, depending on course.  Also discussed consideration of support options for the knee.  May use compression sleeve or wrap if desired, or possibly supportive bracing with activities.  Bracing is not required, simply for comfort with exercise.  Plan to monitor course with home exercises 3 to 4 weeks to begin.  Likely physical therapy next if not improving.    If you have any further questions for your physician or physician s care team you can contact them thru MyChart or by calling [phone].      Christiano Leyva [facility] [place]    -----  Chief Complaint   Patient presents with    Right Knee - Pain       SUBJECTIVE  Karina Tubbs is a/an 38 year old female who is seen as a self referral for evaluation of Right knee.     The patient is seen by themselves.    Onset: several month(s) ago. Reports insidious onset without acute precipitating event. Does do weight training 4-days a week. Notes last week she had done leg training and had increased soreness. Notes improvement.  Location of Pain: right knee, reported tenderness with IT band insertion of lateral  "knee, otherwise diffuse  Worsened by: Weight training with leg (lunges, squats)  Better with:  Treatments tried: no treatment tried to date  Associated symptoms: swelling reported by patient as posterior knee (described as a lump). Notes a history of giving out.    Orthopedic/Surgical history: NO  Social History/Occupation: Corporate       **  Above information per rooming staff.  Additional history:  Pain initially worse, now more intermittent. Can flare with activities.  Notes more with lunging with weights, or with leg curls can get pain.  With initial onset of pain, felt like knee was buckling, but without injury.  Sometimes tender in lateral knee, points near distal IT band.  Occasional right knee cracking this week.      REVIEW OF SYSTEMS:  Review of Systems    OBJECTIVE:  Ht 1.778 m (5' 10\")   Wt 103 kg (227 lb)   BMI 32.57 kg/m     General: healthy, alert and in no distress  Skin: no suspicious lesions or rash.  CV: distal perfusion intact   Resp: normal respiratory effort without conversational dyspnea   Psych: normal mood and affect  Gait: NORMAL  Neuro: Normal light sensory exam of extremity       Right Knee exam    Inspection:   no effusion   no ecchymosis    ROM:      Full active and passive ROM with flexion and extension    Patellar Motion:      Normal patellar tracking noted through range of motion    Tender:      distal IT Band minimal    Non Tender:       remainder of knee area    Special Tests:      neg (-) Richard       neg (-) Lachman       neg (-) anterior drawer       neg (-) posterior drawer       neg (-) varus       neg (-) valgus       no pain with forced extension    Evaluation of ipsilateral kinetic chain:   Some anterior knee excursion with mini squat, single leg squat, mild pain      RADIOLOGY:  Final results and radiologist's interpretation, available in the [facility] health record.  Images were reviewed with the patient in the office today.  My personal interpretation of the " performed imaging: no acute bony abnormality noted. Joint spaces appear preserved.        Recent Results (from the past 24 hour(s))   XR Knee Standing AP [place] Bilat Lat Right    Narrative    XR KNEE STANDING AP [place] BILAT LAT RIGHT 10/3/2023 4:21 PM     HISTORY: Acute pain of right knee    COMPARISON: None.       Impression    IMPRESSION:   Right knee joint spaces are maintained. No fracture or joint  effusion.    Views of the left knee are unremarkable.    JOSE G ROUSSEAU MD         SYSTEM ID:  [id number]

## 2024-04-08 ENCOUNTER — MYC MEDICAL ADVICE (OUTPATIENT)
Dept: OTHER | Facility: CLINIC | Age: 39
End: 2024-04-08
Payer: COMMERCIAL

## 2024-04-09 NOTE — TELEPHONE ENCOUNTER
Routing to scheduling to coordinate the following:  In clinic visit with Dr. Barnett  No imaging needed  Schedule at next available    Appt note:  Follow up to 2/27/23 visit for left TOS; pt has additional questions regarding recent symptoms; no imaging needed at this time.    Nazanin Morales, YOLIN, RN, -[facility] [place]

## 2024-04-27 ENCOUNTER — HEALTH MAINTENANCE LETTER (OUTPATIENT)
Age: 39
End: 2024-04-27

## 2024-04-29 ENCOUNTER — OFFICE VISIT (OUTPATIENT)
Dept: OTHER | Facility: CLINIC | Age: 39
End: 2024-04-29
Attending: SURGERY
Payer: COMMERCIAL

## 2024-04-29 VITALS — SYSTOLIC BLOOD PRESSURE: 130 MMHG | HEART RATE: 80 BPM | DIASTOLIC BLOOD PRESSURE: 83 MMHG

## 2024-04-29 DIAGNOSIS — M25.512 ACUTE PAIN OF LEFT SHOULDER: Primary | ICD-10-CM

## 2024-04-29 PROCEDURE — 99213 OFFICE O/P EST LOW 20 MIN: CPT | Performed by: SURGERY

## 2024-04-29 NOTE — PROGRESS NOTES
I had the pleasure of seeing Karina Tubbs again today.  She is a 38-year-old female who I had seen in February 2023.  She was initially referred to us for further evaluation of the finding of extrinsic compression of the left axillary and subclavian arteries on testing.  The workup was initiated when she had noticed a prominent soft lump in the base of the left neck.  This was further evaluated to be the external jugular vein.  She did not report any symptoms involving the left upper extremity itself.    When I examined her at that time I had noticed that the left radial artery diminishes when the arm is abducted to 180 degrees.    Imaging findings the same.    A few weeks ago she was doing heavy bench press with dumbbells when she noticed pain in the left infraclavicular area.  Again no particular symptoms of paresthesias, radiating pain or numbness in the left upper extremity.    I explained that I really doubt if she is starting to manifest neurogenic thoracic outlet syndrome as that would certainly present with symptoms pertaining to the left upper extremity and not to the infraclavicular and shoulder area.    I have reassured her.  She can follow-up as needed.

## 2024-04-29 NOTE — PROGRESS NOTES
[facility]        Patient is here for a  follow up  to discuss TOS.    Pt is currently taking no meds that would impact our treatment plan.    /83 (BP Location: Left arm, Patient Position: Sitting, Cuff Size: Adult Large)   Pulse 80     The provider has been notified that the patient has no concerns.     Questions patient would like addressed today are: N/A.    Refills are needed: N/A    Has homecare services and agency name:  Yadira Cadet MA

## 2024-07-06 ENCOUNTER — HEALTH MAINTENANCE LETTER (OUTPATIENT)
Age: 39
End: 2024-07-06

## 2024-07-09 ENCOUNTER — ANCILLARY PROCEDURE (OUTPATIENT)
Dept: MAMMOGRAPHY | Facility: CLINIC | Age: 39
End: 2024-07-09
Payer: COMMERCIAL

## 2024-07-09 DIAGNOSIS — Z12.31 VISIT FOR SCREENING MAMMOGRAM: ICD-10-CM

## 2024-07-09 PROCEDURE — 77063 BREAST TOMOSYNTHESIS BI: CPT

## 2024-08-07 ENCOUNTER — PATIENT OUTREACH (OUTPATIENT)
Dept: CARE COORDINATION | Facility: CLINIC | Age: 39
End: 2024-08-07
Payer: COMMERCIAL

## 2025-05-11 ENCOUNTER — HEALTH MAINTENANCE LETTER (OUTPATIENT)
Age: 40
End: 2025-05-11

## 2025-07-16 ENCOUNTER — ANCILLARY PROCEDURE (OUTPATIENT)
Dept: MAMMOGRAPHY | Facility: CLINIC | Age: 40
End: 2025-07-16
Attending: NURSE PRACTITIONER
Payer: COMMERCIAL

## 2025-07-16 DIAGNOSIS — Z12.31 VISIT FOR SCREENING MAMMOGRAM: ICD-10-CM

## 2025-07-16 PROCEDURE — 77063 BREAST TOMOSYNTHESIS BI: CPT

## 2025-08-18 ENCOUNTER — APPOINTMENT (OUTPATIENT)
Dept: URBAN - METROPOLITAN AREA CLINIC 252 | Age: 40
Setting detail: DERMATOLOGY
End: 2025-08-18

## 2025-08-18 ENCOUNTER — RX ONLY (RX ONLY)
Age: 40
End: 2025-08-18

## 2025-08-18 VITALS — RESPIRATION RATE: 16 BRPM | HEIGHT: 70 IN | WEIGHT: 190 LBS

## 2025-08-18 DIAGNOSIS — L82.1 OTHER SEBORRHEIC KERATOSIS: ICD-10-CM

## 2025-08-18 DIAGNOSIS — L29.89 OTHER PRURITUS: ICD-10-CM

## 2025-08-18 DIAGNOSIS — D22 MELANOCYTIC NEVI: ICD-10-CM

## 2025-08-18 DIAGNOSIS — L21.8 OTHER SEBORRHEIC DERMATITIS: ICD-10-CM

## 2025-08-18 DIAGNOSIS — L72.0 EPIDERMAL CYST: ICD-10-CM

## 2025-08-18 DIAGNOSIS — B35.3 TINEA PEDIS: ICD-10-CM

## 2025-08-18 DIAGNOSIS — Z71.89 OTHER SPECIFIED COUNSELING: ICD-10-CM

## 2025-08-18 DIAGNOSIS — L57.8 OTHER SKIN CHANGES DUE TO CHRONIC EXPOSURE TO NONIONIZING RADIATION: ICD-10-CM

## 2025-08-18 DIAGNOSIS — L91.8 OTHER HYPERTROPHIC DISORDERS OF THE SKIN: ICD-10-CM

## 2025-08-18 PROBLEM — D22.62 MELANOCYTIC NEVI OF LEFT UPPER LIMB, INCLUDING SHOULDER: Status: ACTIVE | Noted: 2025-08-18

## 2025-08-18 PROBLEM — D22.5 MELANOCYTIC NEVI OF TRUNK: Status: ACTIVE | Noted: 2025-08-18

## 2025-08-18 PROCEDURE — 99213 OFFICE O/P EST LOW 20 MIN: CPT

## 2025-08-18 PROCEDURE — OTHER PRESCRIPTION: OTHER

## 2025-08-18 PROCEDURE — OTHER COUNSELING: OTHER

## 2025-08-18 RX ORDER — TRIAMCINOLONE ACETONIDE 1 MG/G
CREAM TOPICAL BID
Qty: 1 | Refills: 1 | Status: ERX | COMMUNITY
Start: 2025-08-18

## 2025-08-18 RX ORDER — TRIAMCINOLONE ACETONIDE 1 MG/G
CREAM TOPICAL BID
Qty: 45 | Refills: 1 | Status: ERX

## 2025-08-18 ASSESSMENT — LOCATION SIMPLE DESCRIPTION DERM
LOCATION SIMPLE: LEFT UPPER ARM
LOCATION SIMPLE: LEFT FOOT
LOCATION SIMPLE: LEFT FOREARM
LOCATION SIMPLE: RIGHT CHEEK
LOCATION SIMPLE: RIGHT FOOT
LOCATION SIMPLE: LEFT UPPER BACK
LOCATION SIMPLE: INFERIOR FOREHEAD
LOCATION SIMPLE: RIGHT SHOULDER
LOCATION SIMPLE: ABDOMEN
LOCATION SIMPLE: LEFT WRIST
LOCATION SIMPLE: LEFT SCALP
LOCATION SIMPLE: RIGHT AXILLARY VAULT
LOCATION SIMPLE: LEFT AXILLARY VAULT

## 2025-08-18 ASSESSMENT — LOCATION ZONE DERM
LOCATION ZONE: FACE
LOCATION ZONE: ARM
LOCATION ZONE: SCALP
LOCATION ZONE: TRUNK
LOCATION ZONE: AXILLAE
LOCATION ZONE: FEET

## 2025-08-18 ASSESSMENT — LOCATION DETAILED DESCRIPTION DERM
LOCATION DETAILED: RIGHT AXILLARY VAULT
LOCATION DETAILED: LEFT VENTRAL PROXIMAL FOREARM
LOCATION DETAILED: RIGHT MEDIAL DORSAL FOOT
LOCATION DETAILED: LEFT SUPERIOR UPPER BACK
LOCATION DETAILED: LEFT AXILLARY VAULT
LOCATION DETAILED: LEFT MEDIAL FRONTAL SCALP
LOCATION DETAILED: RIGHT POSTERIOR SHOULDER
LOCATION DETAILED: RIGHT CENTRAL MALAR CHEEK
LOCATION DETAILED: LEFT MEDIAL DORSAL FOOT
LOCATION DETAILED: RIGHT RIB CAGE
LOCATION DETAILED: LEFT LATERAL DORSAL WRIST
LOCATION DETAILED: LEFT ANTERIOR PROXIMAL UPPER ARM
LOCATION DETAILED: INFERIOR MID FOREHEAD

## (undated) DEVICE — CATH EP REPRO DAIG SPRM FX CRV DX EP C

## (undated) DEVICE — CATH THERMOCOOL SMARTTOUCH SF DF CURVE

## (undated) DEVICE — INTRO CATH 12CM 8.5FR FST-CATH

## (undated) DEVICE — PATCH CARTO 3 EXTERNAL REFERENCE 3D MAPPING CREFP6

## (undated) DEVICE — INTRODUCER SHEATH FAST-CATH CATH-LOCK 7FRX12CM 406702

## (undated) DEVICE — KIT VENOUS FLUSH 60210177

## (undated) DEVICE — INTRODUCER SHEATH FAST-CATH CATH-LOCK 6FRX12CM 406701

## (undated) DEVICE — INTRO SHEATH 4FRX10CM PINNACLE RSS402

## (undated) DEVICE — PAD DEFIBRILLATOR ELECTRODE 4.25INX6IN ADULT 2001M-C

## (undated) DEVICE — CATHETER EPT POLARIS X 6FR STEERABLE REPROCESSED 7003D

## (undated) DEVICE — INTRO SHEATH MICRO PLATINUM TIP 4FRX40CM 7274

## (undated) DEVICE — TUBE SET SMARKABLATE IRRIGATION

## (undated) DEVICE — PACK HEART LEFT CUSTOM

## (undated) RX ORDER — FENTANYL CITRATE 50 UG/ML
INJECTION, SOLUTION INTRAMUSCULAR; INTRAVENOUS
Status: DISPENSED
Start: 2021-04-27

## (undated) RX ORDER — ONDANSETRON 2 MG/ML
INJECTION INTRAMUSCULAR; INTRAVENOUS
Status: DISPENSED
Start: 2021-04-27

## (undated) RX ORDER — LIDOCAINE HYDROCHLORIDE 10 MG/ML
INJECTION, SOLUTION EPIDURAL; INFILTRATION; INTRACAUDAL; PERINEURAL
Status: DISPENSED
Start: 2021-04-27

## (undated) RX ORDER — SODIUM CHLORIDE 9 MG/ML
INJECTION, SOLUTION INTRAVENOUS
Status: DISPENSED
Start: 2021-04-27